# Patient Record
Sex: MALE | Race: WHITE | NOT HISPANIC OR LATINO | Employment: OTHER | URBAN - NONMETROPOLITAN AREA
[De-identification: names, ages, dates, MRNs, and addresses within clinical notes are randomized per-mention and may not be internally consistent; named-entity substitution may affect disease eponyms.]

---

## 2023-04-19 PROBLEM — R01.1 HEART MURMUR: Status: ACTIVE | Noted: 2023-04-19

## 2023-04-19 PROBLEM — M25.512 LEFT SHOULDER PAIN: Status: ACTIVE | Noted: 2023-04-19

## 2023-04-19 PROBLEM — I71.20 AORTIC ANEURYSM, THORACIC (CMS-HCC): Status: ACTIVE | Noted: 2023-04-19

## 2023-04-19 PROBLEM — Z98.890 STATUS POST IMPLANTATION OF MITRAL VALVE LEAFLET CLIP: Status: ACTIVE | Noted: 2023-04-19

## 2023-04-19 PROBLEM — Z78.9 SELF-CATHETERIZES URINARY BLADDER: Status: ACTIVE | Noted: 2019-08-13

## 2023-04-19 PROBLEM — Z95.818 STATUS POST IMPLANTATION OF MITRAL VALVE LEAFLET CLIP: Status: ACTIVE | Noted: 2023-04-19

## 2023-04-19 PROBLEM — M19.019 DJD OF SHOULDER: Status: ACTIVE | Noted: 2023-04-19

## 2023-04-19 PROBLEM — I10 BENIGN ESSENTIAL HYPERTENSION: Status: ACTIVE | Noted: 2023-04-19

## 2023-04-19 PROBLEM — N35.919 URETHRAL STRICTURE: Status: ACTIVE | Noted: 2023-04-19

## 2023-04-19 PROBLEM — I34.0 MITRAL REGURGITATION: Status: ACTIVE | Noted: 2023-04-19

## 2023-04-19 PROBLEM — M19.019 DEGENERATIVE JOINT DISEASE OF SHOULDER REGION: Status: ACTIVE | Noted: 2022-09-24

## 2023-04-19 PROBLEM — T46.6X5A MYALGIA DUE TO HMG COA REDUCTASE INHIBITOR: Status: ACTIVE | Noted: 2023-04-19

## 2023-04-19 PROBLEM — H90.3 BILATERAL SENSORINEURAL HEARING LOSS: Status: ACTIVE | Noted: 2023-04-19

## 2023-04-19 PROBLEM — E78.5 HYPERLIPIDEMIA: Status: ACTIVE | Noted: 2023-04-19

## 2023-04-19 PROBLEM — R30.0 DYSURIA: Status: RESOLVED | Noted: 2023-04-19 | Resolved: 2023-04-19

## 2023-04-19 PROBLEM — I49.3 FREQUENT PVCS: Status: ACTIVE | Noted: 2023-04-19

## 2023-04-19 PROBLEM — M25.511 RIGHT SHOULDER PAIN: Status: ACTIVE | Noted: 2023-04-19

## 2023-04-19 PROBLEM — Z85.46 HISTORY OF PROSTATE CANCER: Status: ACTIVE | Noted: 2023-04-19

## 2023-04-19 PROBLEM — D64.9 ANEMIA: Status: ACTIVE | Noted: 2023-04-19

## 2023-04-19 PROBLEM — M79.10 MYALGIA DUE TO HMG COA REDUCTASE INHIBITOR: Status: ACTIVE | Noted: 2023-04-19

## 2023-04-19 PROBLEM — N39.41 URGE INCONTINENCE: Status: ACTIVE | Noted: 2023-04-19

## 2023-04-19 PROBLEM — I50.20 HFREF (HEART FAILURE WITH REDUCED EJECTION FRACTION) (MULTI): Status: ACTIVE | Noted: 2023-04-19

## 2023-04-19 PROBLEM — R31.21 ASYMPTOMATIC MICROSCOPIC HEMATURIA: Status: ACTIVE | Noted: 2023-04-19

## 2023-04-19 RX ORDER — LISINOPRIL 5 MG/1
1 TABLET ORAL DAILY
COMMUNITY
Start: 2022-12-30 | End: 2024-01-23 | Stop reason: ALTCHOICE

## 2023-04-19 RX ORDER — ASPIRIN 81 MG/1
1 TABLET ORAL DAILY
COMMUNITY
Start: 2022-10-31

## 2023-04-19 RX ORDER — METOPROLOL SUCCINATE 25 MG/1
1 TABLET, EXTENDED RELEASE ORAL DAILY
COMMUNITY
Start: 2022-12-30 | End: 2024-04-12 | Stop reason: SDUPTHER

## 2023-04-19 RX ORDER — FUROSEMIDE 20 MG/1
1 TABLET ORAL DAILY
COMMUNITY
Start: 2022-12-02 | End: 2024-01-23 | Stop reason: ALTCHOICE

## 2023-04-19 RX ORDER — PRAVASTATIN SODIUM 40 MG/1
1 TABLET ORAL DAILY
COMMUNITY
Start: 2017-11-13 | End: 2023-10-09

## 2023-04-19 RX ORDER — ACETAMINOPHEN 500 MG
1 TABLET ORAL DAILY
COMMUNITY
Start: 2023-03-03

## 2023-07-25 ENCOUNTER — OFFICE VISIT (OUTPATIENT)
Dept: PRIMARY CARE | Facility: CLINIC | Age: 83
End: 2023-07-25
Payer: MEDICARE

## 2023-07-25 ENCOUNTER — LAB (OUTPATIENT)
Dept: LAB | Facility: LAB | Age: 83
End: 2023-07-25
Payer: MEDICARE

## 2023-07-25 VITALS
BODY MASS INDEX: 25.11 KG/M2 | DIASTOLIC BLOOD PRESSURE: 61 MMHG | TEMPERATURE: 97.3 F | RESPIRATION RATE: 14 BRPM | OXYGEN SATURATION: 96 % | SYSTOLIC BLOOD PRESSURE: 129 MMHG | WEIGHT: 175.4 LBS | HEART RATE: 73 BPM | HEIGHT: 70 IN

## 2023-07-25 DIAGNOSIS — I71.21 ANEURYSM OF ASCENDING AORTA WITHOUT RUPTURE (CMS-HCC): Primary | ICD-10-CM

## 2023-07-25 DIAGNOSIS — I10 BENIGN ESSENTIAL HYPERTENSION: ICD-10-CM

## 2023-07-25 DIAGNOSIS — D64.9 ANEMIA, UNSPECIFIED TYPE: ICD-10-CM

## 2023-07-25 DIAGNOSIS — Z95.818 STATUS POST IMPLANTATION OF MITRAL VALVE LEAFLET CLIP: ICD-10-CM

## 2023-07-25 DIAGNOSIS — I50.20 HFREF (HEART FAILURE WITH REDUCED EJECTION FRACTION) (MULTI): ICD-10-CM

## 2023-07-25 DIAGNOSIS — M19.012 PRIMARY OSTEOARTHRITIS OF LEFT SHOULDER: ICD-10-CM

## 2023-07-25 DIAGNOSIS — Z98.890 STATUS POST IMPLANTATION OF MITRAL VALVE LEAFLET CLIP: ICD-10-CM

## 2023-07-25 PROBLEM — M25.512 LEFT SHOULDER PAIN: Status: RESOLVED | Noted: 2023-04-19 | Resolved: 2023-07-25

## 2023-07-25 PROBLEM — R31.21 ASYMPTOMATIC MICROSCOPIC HEMATURIA: Status: RESOLVED | Noted: 2023-04-19 | Resolved: 2023-07-25

## 2023-07-25 PROBLEM — E78.5 HYPERLIPIDEMIA: Status: RESOLVED | Noted: 2023-04-19 | Resolved: 2023-07-25

## 2023-07-25 PROBLEM — M25.511 RIGHT SHOULDER PAIN: Status: RESOLVED | Noted: 2023-04-19 | Resolved: 2023-07-25

## 2023-07-25 LAB
ALANINE AMINOTRANSFERASE (SGPT) (U/L) IN SER/PLAS: 14 U/L (ref 10–52)
ALBUMIN (G/DL) IN SER/PLAS: 4.4 G/DL (ref 3.4–5)
ALKALINE PHOSPHATASE (U/L) IN SER/PLAS: 53 U/L (ref 33–136)
ANION GAP IN SER/PLAS: 13 MMOL/L (ref 10–20)
ASPARTATE AMINOTRANSFERASE (SGOT) (U/L) IN SER/PLAS: 17 U/L (ref 9–39)
BASOPHILS (10*3/UL) IN BLOOD BY AUTOMATED COUNT: 0.03 X10E9/L (ref 0–0.1)
BASOPHILS/100 LEUKOCYTES IN BLOOD BY AUTOMATED COUNT: 0.6 % (ref 0–2)
BILIRUBIN TOTAL (MG/DL) IN SER/PLAS: 1.5 MG/DL (ref 0–1.2)
CALCIUM (MG/DL) IN SER/PLAS: 9.5 MG/DL (ref 8.6–10.6)
CARBON DIOXIDE, TOTAL (MMOL/L) IN SER/PLAS: 23 MMOL/L (ref 21–32)
CHLORIDE (MMOL/L) IN SER/PLAS: 106 MMOL/L (ref 98–107)
CREATININE (MG/DL) IN SER/PLAS: 1.31 MG/DL (ref 0.5–1.3)
EOSINOPHILS (10*3/UL) IN BLOOD BY AUTOMATED COUNT: 0.17 X10E9/L (ref 0–0.4)
EOSINOPHILS/100 LEUKOCYTES IN BLOOD BY AUTOMATED COUNT: 3.1 % (ref 0–6)
ERYTHROCYTE DISTRIBUTION WIDTH (RATIO) BY AUTOMATED COUNT: 14.4 % (ref 11.5–14.5)
ERYTHROCYTE MEAN CORPUSCULAR HEMOGLOBIN CONCENTRATION (G/DL) BY AUTOMATED: 32.3 G/DL (ref 32–36)
ERYTHROCYTE MEAN CORPUSCULAR VOLUME (FL) BY AUTOMATED COUNT: 87 FL (ref 80–100)
ERYTHROCYTES (10*6/UL) IN BLOOD BY AUTOMATED COUNT: 4.66 X10E12/L (ref 4.5–5.9)
GFR MALE: 55 ML/MIN/1.73M2
GLUCOSE (MG/DL) IN SER/PLAS: 91 MG/DL (ref 74–99)
HEMATOCRIT (%) IN BLOOD BY AUTOMATED COUNT: 40.5 % (ref 41–52)
HEMOGLOBIN (G/DL) IN BLOOD: 13.1 G/DL (ref 13.5–17.5)
IMMATURE GRANULOCYTES/100 LEUKOCYTES IN BLOOD BY AUTOMATED COUNT: 0.2 % (ref 0–0.9)
LEUKOCYTES (10*3/UL) IN BLOOD BY AUTOMATED COUNT: 5.5 X10E9/L (ref 4.4–11.3)
LYMPHOCYTES (10*3/UL) IN BLOOD BY AUTOMATED COUNT: 1.46 X10E9/L (ref 0.8–3)
LYMPHOCYTES/100 LEUKOCYTES IN BLOOD BY AUTOMATED COUNT: 26.8 % (ref 13–44)
MONOCYTES (10*3/UL) IN BLOOD BY AUTOMATED COUNT: 0.65 X10E9/L (ref 0.05–0.8)
MONOCYTES/100 LEUKOCYTES IN BLOOD BY AUTOMATED COUNT: 11.9 % (ref 2–10)
NEUTROPHILS (10*3/UL) IN BLOOD BY AUTOMATED COUNT: 3.13 X10E9/L (ref 1.6–5.5)
NEUTROPHILS/100 LEUKOCYTES IN BLOOD BY AUTOMATED COUNT: 57.4 % (ref 40–80)
NRBC (PER 100 WBCS) BY AUTOMATED COUNT: 0 /100 WBC (ref 0–0)
PLATELETS (10*3/UL) IN BLOOD AUTOMATED COUNT: 204 X10E9/L (ref 150–450)
POTASSIUM (MMOL/L) IN SER/PLAS: 4.6 MMOL/L (ref 3.5–5.3)
PROTEIN TOTAL: 6.6 G/DL (ref 6.4–8.2)
SODIUM (MMOL/L) IN SER/PLAS: 137 MMOL/L (ref 136–145)
UREA NITROGEN (MG/DL) IN SER/PLAS: 33 MG/DL (ref 6–23)

## 2023-07-25 PROCEDURE — 3074F SYST BP LT 130 MM HG: CPT | Performed by: FAMILY MEDICINE

## 2023-07-25 PROCEDURE — 36415 COLL VENOUS BLD VENIPUNCTURE: CPT

## 2023-07-25 PROCEDURE — 1125F AMNT PAIN NOTED PAIN PRSNT: CPT | Performed by: FAMILY MEDICINE

## 2023-07-25 PROCEDURE — 1170F FXNL STATUS ASSESSED: CPT | Performed by: FAMILY MEDICINE

## 2023-07-25 PROCEDURE — 1159F MED LIST DOCD IN RCRD: CPT | Performed by: FAMILY MEDICINE

## 2023-07-25 PROCEDURE — 1160F RVW MEDS BY RX/DR IN RCRD: CPT | Performed by: FAMILY MEDICINE

## 2023-07-25 PROCEDURE — 99214 OFFICE O/P EST MOD 30 MIN: CPT | Performed by: FAMILY MEDICINE

## 2023-07-25 PROCEDURE — 1036F TOBACCO NON-USER: CPT | Performed by: FAMILY MEDICINE

## 2023-07-25 PROCEDURE — 85025 COMPLETE CBC W/AUTO DIFF WBC: CPT

## 2023-07-25 PROCEDURE — 3078F DIAST BP <80 MM HG: CPT | Performed by: FAMILY MEDICINE

## 2023-07-25 PROCEDURE — 80053 COMPREHEN METABOLIC PANEL: CPT

## 2023-07-25 ASSESSMENT — ACTIVITIES OF DAILY LIVING (ADL)
MANAGING_FINANCES: INDEPENDENT
GROCERY_SHOPPING: INDEPENDENT
DOING_HOUSEWORK: INDEPENDENT
TAKING_MEDICATION: INDEPENDENT
BATHING: INDEPENDENT
DRESSING: INDEPENDENT

## 2023-07-25 ASSESSMENT — PATIENT HEALTH QUESTIONNAIRE - PHQ9
SUM OF ALL RESPONSES TO PHQ9 QUESTIONS 1 AND 2: 0
1. LITTLE INTEREST OR PLEASURE IN DOING THINGS: NOT AT ALL
2. FEELING DOWN, DEPRESSED OR HOPELESS: NOT AT ALL

## 2023-07-25 NOTE — ASSESSMENT & PLAN NOTE
Pain has been noticeably more severe.  Follows with Dr. Madden for periodic cortisone injections.  His most recent appt was canceled. Now scheduled for August 1st.  Discussed that he may be candidate for surgery.

## 2023-07-25 NOTE — PROGRESS NOTES
"Subjective     Patient ID: Josesito Tamayo is a 83 y.o. male who presents for follow up of chronic medical conditions.      Td- 06/15/2021     High cholesterol evaluation.     Supplements: y   specific diet plan: y   exercising 30 min daily?: y     Fatigue:N  Chest pains:N  Shortness of Breath:N  Sudden Headaches: N  Vision loss: N  Syncope (fainting): N  Leg Claudication (limping/leg tiredness): N   Taking medication daily: Y  Medication side effects:N    High blood pressure evaluation.     Review of symptoms:  Fatigue: N  Headaches: N   Blurred vision:  N  Palpitations: N  Chest pains: N  Shortness of Breath: N  Swelling of legs: N   Blood in urine: N   Taking medications daily: Y   Medication side effects:N   Problems with medication compliance:N  Baby Aspirin 81 mg daily: Y         HPI  Left shoulder pain: Patient was scheduled to see Dr. Madden left shoulder injection but his appt was cancelled. He states that pain was so severe that he had difficulty sleeping. Since then, the pain has improved and become more manageable. He has been able to control pain in the past with ibuprofen but it no longer is providing effective relief. Previous cortisone injections have provided relief.     Patient reports mild cough. He suspects it is a side effect of lisinopril.     Patient c/o constipation.    Follows with audiologist for hearing aids.    Review of Systems  constitutional: as per HPI  eyes:as per HPI  CV:as per HPI  Respiratory:as per HPI  GI:as per HPI  neuro:as per HPI  endo:as per HPI  heme/lymph:as per HPI     Objective   /61 (BP Location: Right arm, Patient Position: Sitting, BP Cuff Size: Adult)   Pulse 73   Temp 36.3 °C (97.3 °F) (Temporal)   Resp 14   Ht 1.778 m (5' 10\")   Wt 79.6 kg (175 lb 6.4 oz)   SpO2 96%   BMI 25.17 kg/m²   Physical Exam  Constitutional:       Appearance: Normal appearance. He is overweight.   Cardiovascular:      Rate and Rhythm: Normal rate and regular rhythm. "   Pulmonary:      Effort: Pulmonary effort is normal.      Breath sounds: Normal breath sounds.         Assessment/Plan   Problem List Items Addressed This Visit          Cardiac and Vasculature    Aortic aneurysm, thoracic (CMS/HCC)     Stable.  No change in size per echocardiogram from March 2023.  Continue to monitor.         Benign essential hypertension     Hypertension:  Blood pressure in office today was   BP Readings from Last 1 Encounters:   07/25/23 129/61   The goal range for blood pressure is below 130/80.   We will continue to monitor.   Continue lisinopril 5 mg and metoprolol 25 mg daily.         Relevant Orders    Comprehensive Metabolic Panel    HFrEF (heart failure with reduced ejection fraction) (CMS/HCC)     Echo March 2023. EF of 40%.  Due for repeat in 2024.  Continue Lasix, lisinopril, and metoprolol.         Status post implantation of mitral valve leaflet clip     Underwent MitraClip procedure in December 2022.   Follows with [facility].            Hematology and Neoplasia    Anemia     Mild.  Hemoglobin 13.4 in January 2023.  Ordered CBC.         Relevant Orders    CBC and Auto Differential    Comprehensive Metabolic Panel       Musculoskeletal and Injuries    DJD of shoulder     Pain has been noticeably more severe.  Follows with Dr. Madden for periodic cortisone injections.  His most recent appt was canceled. Now scheduled for August 1st.  Discussed that he may be candidate for surgery.          Other Visit Diagnoses       Routine general medical examination at health care facility    -  Primary    Screening for multiple conditions              Follow up in 6 months.      By signing my name below ISam scribe, attest that this documentation has been prepared under the direction and in the presence of Dr. Ronnie Kay. 07/25/23

## 2023-07-25 NOTE — PROGRESS NOTES
"Subjective   Reason for Visit: Josesito Tamayo is an 83 y.o. male here for a Medicare Wellness visit.          Reviewed all medications by prescribing practitioner or clinical pharmacist (such as prescriptions, OTCs, herbal therapies and supplements) and documented in the medical record.    HPI      Patient Care Team:  Ronnie Kay MD as PCP - General  Ronnie Kay MD as PCP - [employer] PCP     Review of Systems  constitutional: as per HPI  eyes:as per HPI  CV:as per HPI  Respiratory:as per HPI  GI:as per HPI  neuro:as per HPI  endo:as per HPI  heme/lymph:as per HPI     Objective   Vitals:  /61 (BP Location: Right arm, Patient Position: Sitting, BP Cuff Size: Adult)   Pulse 73   Temp 36.3 °C (97.3 °F) (Temporal)   Resp 14   Ht 1.778 m (5' 10\")   Wt 79.6 kg (175 lb 6.4 oz)   SpO2 96%   BMI 25.17 kg/m²       Physical Exam  Constitutional:       Appearance: Normal appearance. He is overweight.   Cardiovascular:      Rate and Rhythm: Normal rate and regular rhythm.   Pulmonary:      Effort: Pulmonary effort is normal.      Breath sounds: Normal breath sounds.         Assessment/Plan   Problem List Items Addressed This Visit    None  Visit Diagnoses       Routine general medical examination at health care facility    -  Primary    Screening for multiple conditions                   By signing my name below I, ashleigh Ballard, attest that this documentation has been prepared under the direction and in the presence of Dr. Ronnie Kay. 07/25/23   "

## 2023-07-25 NOTE — ASSESSMENT & PLAN NOTE
Hypertension:  Blood pressure in office today was   BP Readings from Last 1 Encounters:   07/25/23 129/61     The goal range for blood pressure is below 130/80.   We will continue to monitor.   Continue lisinopril 5 mg and metoprolol 25 mg daily.

## 2023-07-25 NOTE — PATIENT INSTRUCTIONS
Recommended to start using daily Metamucil fiber supplement (1 heaping tbsp in 8 oz of water).  Discussed that Lasix and metoprolol may be contributing to constipation.

## 2023-07-25 NOTE — PROGRESS NOTES
"Subjective   Reason for Visit: Josesito Tamayo is an 83 y.o. male here for a Medicare Wellness visit.          Reviewed all medications by prescribing practitioner or clinical pharmacist (such as prescriptions, OTCs, herbal therapies and supplements) and documented in the medical record.    HPI    Patient Care Team:  Ronnie Kay MD as PCP - General  Ronnie Kay MD as PCP - [employer] PCP     Review of Systems    Objective   Vitals:  /61 (BP Location: Right arm, Patient Position: Sitting, BP Cuff Size: Adult)   Pulse 73   Temp 36.3 °C (97.3 °F) (Temporal)   Resp 14   Ht 1.778 m (5' 10\")   Wt 79.6 kg (175 lb 6.4 oz)   SpO2 96%   BMI 25.17 kg/m²       Physical Exam    Assessment/Plan   Problem List Items Addressed This Visit    None  Visit Diagnoses     Routine general medical examination at health care facility    -  Primary    Screening for multiple conditions                 "

## 2023-09-27 ENCOUNTER — HOSPITAL ENCOUNTER (OUTPATIENT)
Facility: HOSPITAL | Age: 83
Setting detail: OUTPATIENT SURGERY
End: 2023-09-27
Attending: ORTHOPAEDIC SURGERY | Admitting: ORTHOPAEDIC SURGERY
Payer: MEDICARE

## 2023-10-03 ENCOUNTER — HOSPITAL ENCOUNTER (OUTPATIENT)
Dept: RADIOLOGY | Facility: HOSPITAL | Age: 83
Discharge: HOME | End: 2023-10-03
Payer: MEDICARE

## 2023-10-03 DIAGNOSIS — M19.019 PRIMARY OSTEOARTHRITIS, UNSPECIFIED SHOULDER: ICD-10-CM

## 2023-10-03 PROCEDURE — 73200 CT UPPER EXTREMITY W/O DYE: CPT | Mod: LEFT SIDE | Performed by: RADIOLOGY

## 2023-10-03 PROCEDURE — 73200 CT UPPER EXTREMITY W/O DYE: CPT

## 2023-10-03 PROCEDURE — 76497 UNLISTED CT PROCEDURE: CPT | Mod: LT

## 2023-10-09 DIAGNOSIS — E78.5 HYPERLIPIDEMIA, UNSPECIFIED HYPERLIPIDEMIA TYPE: ICD-10-CM

## 2023-10-09 RX ORDER — PRAVASTATIN SODIUM 40 MG/1
40 TABLET ORAL DAILY
Qty: 90 TABLET | Refills: 3 | Status: SHIPPED | OUTPATIENT
Start: 2023-10-09

## 2023-10-10 ENCOUNTER — OFFICE VISIT (OUTPATIENT)
Dept: ORTHOPEDIC SURGERY | Facility: HOSPITAL | Age: 83
End: 2023-10-10
Payer: MEDICARE

## 2023-10-10 DIAGNOSIS — M25.512 LEFT SHOULDER PAIN, UNSPECIFIED CHRONICITY: Primary | ICD-10-CM

## 2023-10-10 PROCEDURE — 1159F MED LIST DOCD IN RCRD: CPT | Performed by: ORTHOPAEDIC SURGERY

## 2023-10-10 PROCEDURE — 1125F AMNT PAIN NOTED PAIN PRSNT: CPT | Performed by: ORTHOPAEDIC SURGERY

## 2023-10-10 PROCEDURE — 99213 OFFICE O/P EST LOW 20 MIN: CPT | Mod: 25 | Performed by: ORTHOPAEDIC SURGERY

## 2023-10-10 PROCEDURE — 1160F RVW MEDS BY RX/DR IN RCRD: CPT | Performed by: ORTHOPAEDIC SURGERY

## 2023-10-10 PROCEDURE — 99213 OFFICE O/P EST LOW 20 MIN: CPT | Performed by: ORTHOPAEDIC SURGERY

## 2023-10-10 PROCEDURE — 1036F TOBACCO NON-USER: CPT | Performed by: ORTHOPAEDIC SURGERY

## 2023-10-10 NOTE — PROGRESS NOTES
Subjective    Patient ID: Josesito Tamayo is a 83 y.o. male.    Chief Complaint: Left shoulder pain    Mr. Tamayo is a 82-year-old male who is well known to us for evaluation of his right shoulder pain. Reporting today due to a new onset of left shoulder pain. He explains that he also had his shingles and Covid boosters injected into his left shoulder which exacerbated his symptoms. He has done well with injections in his right arm and is hoping for one in his left arm today.    His right shoulder is doing well and is a 4/10 at present. There has been no recent injury. He continues to enjoy playing golf. He wants to continue injections and avoid surgery if possible. He doesn't believe he needs a repeat injection in his right arm today. He notes that his shoulder is about 80% normal currently. He has had an ultrasound guided injection into his scapula in the past which helped his pain. He hopes for a repeat injection today.    Past medical history, surgical history, social history, family history were all reviewed and are as per the [facility] patient health history questionnaire form that I signed and scanned into the chart today.    08/01/23  He presents today not feeling well. He explains his left shoulder injection worked fairly well for a while. The last 8-9 weeks have been rough since it wore off and his appointment was cancelled.     10/10/23  Josesito returns to the clinic today for a repeat followup visit regarding his left shoulder.     He explains today that injections have been helping again. He is hoping to continue with these today.           Objective   Patient is a well-developed, well-nourished male in no acute distress.  Breathes with normal chest rises.  Pupils are round and symmetric today.  Awake, alert, and oriented x3.      Examination of the left shoulder today reveals the skin to be intact. There is no sign of any atrophy, lesions, or abrasions. There is no pain to palpation of the bony prominences.  Cervical lymphadenopathy examined, and this was negative. Patient had 5 out of 5 wrist flexion, extension, and thumb extension bilaterally. Sensation was intact to light touch to median, ulnar, radial axillary, and musculocutaneous nerves bilaterally. Positive radial pulse bilaterally. Provocative maneuvers on the left side today were negative.  Range of motion of the left shoulder revealed 0-170° of forward elevation, 0-60° of external rotation, and internal rotation was to T-12.     Examination of the right shoulder today reveals the skin to be intact. There is no sign of any atrophy, lesions, or abrasions. There is no pain to palpation of the bony prominences. Cervical lymphadenopathy examined, and this was negative. Patient had 5 out of 5 wrist flexion, extension, and thumb extension, bilaterally. Sensation was intact to light touch to median, ulnar, radial, axillary, and musculocutaneous nerves bilaterally. Positive radial pulse bilaterally. Provocative maneuvers on the right side today were negative.  Range of motion of the right shoulder revealed 0-170° of forward elevation, 0-60° of external rotation, and internal rotation up to T-12.     Image Results:  CT unlisted procedure  Narrative: Interpreted By:  Vik Martinez,   STUDY:  CT left shoulder; 10/3/2023 9:41 am      INDICATION:  Signs/Symptoms:m19.019.      COMPARISON:  None.      ACCESSION NUMBER(S):  [id number]      ORDERING CLINICIAN:  MARINO GARCIA      TECHNIQUE:  Helical trans axial images of the left shoulder were obtained with  additional orthogonal reconstructions with bone technique.      FINDINGS:  Bony structures:  Intact without evidence of fracture or dislocation.      Joint spaces:  Marked narrowing of the glenohumeral joint with  moderate subchondral sclerosis and subchondral cyst formation, and  mild osteophytosis particularly inferiorly. There is a small to  moderate joint effusion, without radiodense loose body. The  acromioclavicular  joint is maintained.      Tendons and ligaments:  Maintained as visualized.      Musculature and fascia:  Maintained.      Subcutaneous tissue:  Unremarkable without significant edema.      Vasculature:  There is moderate coronary artery atherosclerotic  calcification primarily involving the LAD.      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      Signed by: Vik Martinez 10/5/2023 5:18 PM  Dictation workstation:   [id number]  CT also shows a type A glenoid    Assessment/Plan   Encounter Diagnoses:  Left shoulder pain, unspecified chronicity    Patient with largely resolved right shoulder pain with underlying chronic issue that flares up, as well as endstage arthritis of the left shoulder    At this time we had a long discussion about the various options for shoulder arthritis.  1. Do nothing. Continue activity modifications.  2. Consider cortisone injections into the glenohumeral joint. This would give pain relief that is temporary. This would not stop the progression of arthritis. For some patients, this injection can last not at all, for 2 weeks, 4 weeks, 3 months or longer. The risk of the injection is fairly minimal but does include a very small chance of infection.  3. A total shoulder replacement is the third option. This will give the patient a more permanent solution to pain relief. It would also improve function. There is no rush to this procedure it is an elective procedure.    We discussed his diagnosis today at length. At this point his treatment options are to continue with injections or move forward with surgery. We will continue injections every 10 weeks. His CT scan is good until December 2024 for surgical planning. From my standpoint there is still no rush to surgery. We did discuss the fact that he will need to straight catch after surgery when we get there, and that he will be able to do that while recovering.     No orders of the  defined types were placed in this encounter.  Followup in 10 weeks  No follow-ups on file.    Scribe Attestation  By signing my name below, I, Joyce Subramanian   attest that this documentation has been prepared under the direction and in the presence of Adelfo Madden MD.

## 2023-11-06 ENCOUNTER — DOCUMENTATION (OUTPATIENT)
Dept: AUDIOLOGY | Facility: CLINIC | Age: 83
End: 2023-11-06
Payer: MEDICARE

## 2023-11-06 NOTE — PROGRESS NOTES
HEARING AID DROP OFF    Patient dropped off his hearing aids and  due to broken left .   was replaced and listening check revealed good working function of device.  Patient was called and discussed his  had been replaced under warranty.  Patient notified device can be picked up at the  at his convenience.  Patient satisfied.    N/c under warranty repair.    Sanjay Arreola, CCC-A

## 2023-11-09 ENCOUNTER — APPOINTMENT (OUTPATIENT)
Dept: UROLOGY | Facility: CLINIC | Age: 83
End: 2023-11-09
Payer: MEDICARE

## 2023-12-07 ENCOUNTER — TELEMEDICINE (OUTPATIENT)
Dept: CARDIOLOGY | Facility: CLINIC | Age: 83
End: 2023-12-07
Payer: MEDICARE

## 2023-12-07 DIAGNOSIS — Z95.2 S/P TAVR (TRANSCATHETER AORTIC VALVE REPLACEMENT): Primary | ICD-10-CM

## 2023-12-07 DIAGNOSIS — Z98.890 S/P MITRAL VALVE CLIP IMPLANTATION: ICD-10-CM

## 2023-12-07 DIAGNOSIS — Z95.818 S/P MITRAL VALVE CLIP IMPLANTATION: ICD-10-CM

## 2023-12-07 PROCEDURE — 99214 OFFICE O/P EST MOD 30 MIN: CPT | Performed by: NURSE PRACTITIONER

## 2023-12-07 NOTE — PROGRESS NOTES
Structural Heart Follow up visit      Josesito Tamayo is a 83 y.o.  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 Presents 1 month s/p AVTAR - MitraClip x2 XTW (between  and A2/P2) using general anesthesia on 22 with Dr. Dominguez.       denies SOB,CAMPUZANO, fatigue  Recent Hospitalizations  No    patient with   Past Medical History:   Diagnosis Date    Bursitis of unspecified shoulder 2020    Scapulothoracic bursitis    Candidiasis of skin and nail 2019    Candidal dermatitis    Chronic rhinitis 10/09/2017    Rhinitis    Disorder of male genital organs, unspecified 2016    Disorder of scrotum    Dysuria 2023    Encounter for immunization 06/15/2021    Encounter for immunization    Encounter for immunization     Immunization due    Encounter for other preprocedural examination 2022    Preoperative clearance    Encounter for screening for depression 2022    Screening for depression    Laceration without foreign body of unspecified finger without damage to nail, initial encounter 06/15/2021    Finger laceration    Other conditions influencing health status 10/09/2017    History of cough    Other difficulties with micturition 2016    Abnormal urination    Other difficulties with micturition 2016    Abnormal urinary stream    Other shoulder lesions, unspecified shoulder 2015    Rotator cuff tendinitis    Other specified disorders of ear, unspecified ear 2017    Ear fullness    Other specified disorders of eustachian tube, right ear 11/10/2017    Dysfunction of right eustachian tube    Other specified personal risk factors, not elsewhere classified 2019    Pneumococcal vaccination indicated    Other specified personal risk factors, not elsewhere classified 2018    Pneumococcal vaccination indicated    Other symptoms and signs involving the genitourinary system  07/11/2016    Abnormal prostate exam    Pain in unspecified knee 01/05/2015    Knee pain    Personal history of malignant neoplasm of prostate 12/28/2020    History of prostate cancer    Personal history of other diseases of the circulatory system     History of hypertension    Personal history of other diseases of the circulatory system     Personal history of cardiac murmur    Personal history of other diseases of the nervous system and sense organs 11/27/2017    History of eustachian tube dysfunction    Personal history of other endocrine, nutritional and metabolic disease     History of high cholesterol    Personal history of other infectious and parasitic diseases     History of mumps    Personal history of other specified conditions 02/20/2018    History of urinary frequency    Personal history of other specified conditions     History of urinary frequency    Personal history of other specified conditions 07/21/2022    History of dysuria    Personal history of other specified conditions 04/01/2015    History of gross hematuria    Personal history of urinary (tract) infections 08/10/2018    History of recurrent urinary tract infection    Personal history of urinary (tract) infections 07/11/2016    History of urinary tract infection    Sudden idiopathic hearing loss, 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     No echocardiogram results found for the past 12 months          Heart Failure Follow up    NYHA class 1    Edema Denies  Dyspnea on Exertion Denies  Fatigue Denies  Exercise Intolerance Denies  Orthopnea Denies  PND Denies    Chest pain No  Syncope No  Palpitations No  Weight gain No  Weight loss No        All organ systems normal           K[facility]Q Questionnaire      1  Heart failure affects different people in  different ways. Some feel shortness of breath while others feel fatigue. Please indicate how much you are limited by heart failure (shortness of breath or fatigue) in your ability to do the following activities over the past 2 weeks.     A.) Showering/bathing  5. Not at All  B.) Walking 1 block on level ground 5. Not at All  C.) Hurrying or Jogging   5. Not at All    2.  Over the past 2 weeks, how many times did you have swelling in your feet, ankles or legs when you woke up in the morning? 5. Never    3.  Over the past 2 weeks, on average, how many times has fatigue limited your ability to do what you wanted? 7. Never    4.  Over the past 2 weeks, on average, how many times has shortness of breath limited your ability to do what you wanted? 7. Never    5.  Over the past 2 weeks, on average, how many times have you been forced to sleep sitting up in a chair or with at least 3 pillows to prop you up because of shortness of breath? Never    6. Over the past 2 weeks, how much has your heart failure limited your enjoyment of life? It has slightly limited my enjoyment of life    7. If you had to spend the rest of your life with your heart failure the way it is right now, how would you feel about this? 4. Mostly satisfied    8. How much does your heart failure affect your lifestyle? Please indicate how your heart failure may have limited yourparticipation in the following activities over the past 2 weeks    A.)  Hobbies, recreational activities  5. Did not limit at all    B.) Working or doing household chores  5. Did not limit at all    C.) Visiting family or friends out of your home  5. Did not limit at all    Impression    Doing well clinically, states he has no lower extremity edema. Does not check BP at home, states it has always been wnls. States he does not get dizzy when bending down to place sandy in ground or picking up sticks      Plan:  - Cont current medication regimen  - ASA for life  - echo now  - Cont to  increase activity as tolerated   - f/u with Dr Jackson  - Life-long Dental SBE prophylaxis needed      Time Spent:I spent 25 minutes of a total visit of 10 minutes in counseling/ direct management/discussion/coordination of Josesito's care.

## 2023-12-21 ENCOUNTER — APPOINTMENT (OUTPATIENT)
Dept: ORTHOPEDIC SURGERY | Facility: HOSPITAL | Age: 83
End: 2023-12-21
Payer: MEDICARE

## 2023-12-22 ENCOUNTER — HOSPITAL ENCOUNTER (OUTPATIENT)
Dept: CARDIOLOGY | Facility: CLINIC | Age: 83
Discharge: HOME | End: 2023-12-22
Payer: MEDICARE

## 2023-12-22 DIAGNOSIS — Z95.818 S/P MITRAL VALVE CLIP IMPLANTATION: ICD-10-CM

## 2023-12-22 DIAGNOSIS — Z98.890 S/P MITRAL VALVE CLIP IMPLANTATION: ICD-10-CM

## 2023-12-22 PROCEDURE — 93306 TTE W/DOPPLER COMPLETE: CPT | Performed by: INTERNAL MEDICINE

## 2023-12-22 PROCEDURE — 93306 TTE W/DOPPLER COMPLETE: CPT

## 2023-12-25 LAB
AORTIC VALVE PEAK VELOCITY: 1.77
AV PEAK GRADIENT: 12.5
AVA (PEAK VEL): 2.19
EJECTION FRACTION APICAL 4 CHAMBER: 55.3
EJECTION FRACTION: 57
LEFT VENTRICLE INTERNAL DIMENSION DIASTOLE: 6.71 (ref 3.5–6)
LEFT VENTRICULAR OUTFLOW TRACT DIAMETER: 2.3
MITRAL VALVE E/A RATIO: 2.05
MITRAL VALVE E/E' RATIO: 18.5
RIGHT VENTRICLE FREE WALL PEAK S': 18
RIGHT VENTRICLE PEAK SYSTOLIC PRESSURE: 46.9
TRICUSPID ANNULAR PLANE SYSTOLIC EXCURSION: 2.6

## 2024-01-02 ENCOUNTER — APPOINTMENT (OUTPATIENT)
Dept: ORTHOPEDIC SURGERY | Facility: HOSPITAL | Age: 84
End: 2024-01-02
Payer: MEDICARE

## 2024-01-08 NOTE — PROGRESS NOTES
Subjective    Patient ID: Josesito Tamayo is a 83 y.o. male.    Chief Complaint: Left shoulder pain    Mr. Tamayo is a 82-year-old male who is well known to us for evaluation of his right shoulder pain. Reporting today due to a new onset of left shoulder pain. He explains that he also had his shingles and Covid boosters injected into his left shoulder which exacerbated his symptoms. He has done well with injections in his right arm and is hoping for one in his left arm today.    His right shoulder is doing well and is a 4/10 at present. There has been no recent injury. He continues to enjoy playing golf. He wants to continue injections and avoid surgery if possible. He doesn't believe he needs a repeat injection in his right arm today. He notes that his shoulder is about 80% normal currently. He has had an ultrasound guided injection into his scapula in the past which helped his pain. He hopes for a repeat injection today.    08/01/23  He presents today not feeling well. He explains his left shoulder injection worked fairly well for a while. The last 8-9 weeks have been rough since it wore off and his appointment was cancelled.     10/10/23  Josesito returns to the clinic today for a repeat followup visit regarding his left shoulder.     He explains today that injections have been helping again. He is hoping to continue with these today.     01/09/24  Josesito returns to the clinic today for a repeat follow up regarding his left shoulder.     He reports his left shoulder improved immensely after his previous injection. He explains that on Sunday night he took two Tylenol and then woke up yesterday morning and was able to reach above his head. He does still have some discomfort around his bicep.     Past medical history, surgical history, social history, family history were all reviewed and are as per the blue patient health history questionnaire form that I signed and scanned into the chart today.          Objective   Patient  is a well-developed, well-nourished male in no acute distress.  Breathes with normal chest rises.  Pupils are round and symmetric today.  Awake, alert, and oriented x3.      Examination of the left shoulder today reveals the skin to be intact. There is no sign of any atrophy, lesions, or abrasions. There is no pain to palpation of the bony prominences. Cervical lymphadenopathy examined, and this was negative. Patient had 5 out of 5 wrist flexion, extension, and thumb extension bilaterally. Sensation was intact to light touch to median, ulnar, radial axillary, and musculocutaneous nerves bilaterally. Positive radial pulse bilaterally. Provocative maneuvers on the left side today were negative.  Range of motion of the left shoulder revealed 0-170° of forward elevation, 0-60° of external rotation, and internal rotation was to T-12.     Examination of the right shoulder today reveals the skin to be intact. There is no sign of any atrophy, lesions, or abrasions. There is no pain to palpation of the bony prominences. Cervical lymphadenopathy examined, and this was negative. Patient had 5 out of 5 wrist flexion, extension, and thumb extension, bilaterally. Sensation was intact to light touch to median, ulnar, radial, axillary, and musculocutaneous nerves bilaterally. Positive radial pulse bilaterally. Provocative maneuvers on the right side today were negative.  Range of motion of the right shoulder revealed 0-170° of forward elevation, 0-60° of external rotation, and internal rotation up to T-12.     Image Results:  CT also shows a type A glenoid    Patient ID: Josesito Tamayo is a 83 y.o. male.    L Inj/Asp: L subacromial bursa on 1/9/2024 1:09 PM  Indications: pain  Details: 20 G needle, anterior approach  Outcome: tolerated well, no immediate complications  Procedure, treatment alternatives, risks and benefits explained, specific risks discussed. Consent was given by the patient. Immediately prior to procedure a time out  was called to verify the correct patient, procedure, equipment, support staff and site/side marked as required. Patient was prepped and draped in the usual sterile fashion.           Assessment/Plan   Encounter Diagnoses:  No diagnosis found.  Patient with largely resolved right shoulder pain with underlying chronic issue that flares up, as well as endstage arthritis of the left shoulder    At this time we had a long discussion about the various options for shoulder arthritis.  1. Do nothing. Continue activity modifications.  2. Consider cortisone injections into the glenohumeral joint. This would give pain relief that is temporary. This would not stop the progression of arthritis. For some patients, this injection can last not at all, for 2 weeks, 4 weeks, 3 months or longer. The risk of the injection is fairly minimal but does include a very small chance of infection.  3. A total shoulder replacement is the third option. This will give the patient a more permanent solution to pain relief. It would also improve function. There is no rush to this procedure it is an elective procedure.    We did a repeat injection for him today. He tolerated this well. We will see him back in 3 and a half months for a repeat injection at that time. If he feels that he needs a repeat injection at 3 months he is to call us and let us know.     No orders of the defined types were placed in this encounter.    Follow up in 3 and a half months.     Scribe Attestation  By signing my name below, Britni RODRIGUEZ Scribe   attest that this documentation has been prepared under the direction and in the presence of Adelfo Madden MD.

## 2024-01-09 ENCOUNTER — OFFICE VISIT (OUTPATIENT)
Dept: ORTHOPEDIC SURGERY | Facility: HOSPITAL | Age: 84
End: 2024-01-09
Payer: MEDICARE

## 2024-01-09 VITALS — WEIGHT: 175 LBS | HEIGHT: 70 IN | BODY MASS INDEX: 25.05 KG/M2

## 2024-01-09 DIAGNOSIS — M25.511 CHRONIC RIGHT SHOULDER PAIN: Primary | ICD-10-CM

## 2024-01-09 DIAGNOSIS — G89.29 CHRONIC RIGHT SHOULDER PAIN: Primary | ICD-10-CM

## 2024-01-09 PROCEDURE — 1159F MED LIST DOCD IN RCRD: CPT | Performed by: ORTHOPAEDIC SURGERY

## 2024-01-09 PROCEDURE — 20610 DRAIN/INJ JOINT/BURSA W/O US: CPT | Performed by: ORTHOPAEDIC SURGERY

## 2024-01-09 PROCEDURE — 1125F AMNT PAIN NOTED PAIN PRSNT: CPT | Performed by: ORTHOPAEDIC SURGERY

## 2024-01-09 PROCEDURE — 1160F RVW MEDS BY RX/DR IN RCRD: CPT | Performed by: ORTHOPAEDIC SURGERY

## 2024-01-09 PROCEDURE — 1036F TOBACCO NON-USER: CPT | Performed by: ORTHOPAEDIC SURGERY

## 2024-01-09 PROCEDURE — 99214 OFFICE O/P EST MOD 30 MIN: CPT | Performed by: ORTHOPAEDIC SURGERY

## 2024-01-09 ASSESSMENT — PAIN - FUNCTIONAL ASSESSMENT: PAIN_FUNCTIONAL_ASSESSMENT: 0-10

## 2024-01-09 ASSESSMENT — PAIN DESCRIPTION - DESCRIPTORS: DESCRIPTORS: ACHING

## 2024-01-09 ASSESSMENT — PAIN SCALES - GENERAL: PAINLEVEL_OUTOF10: 7

## 2024-01-20 PROBLEM — C61 MALIGNANT NEOPLASM OF PROSTATE (MULTI): Status: ACTIVE | Noted: 2024-01-20

## 2024-01-20 PROBLEM — Z86.19 HISTORY OF MUMPS: Status: ACTIVE | Noted: 2024-01-20

## 2024-01-20 PROBLEM — Z86.79 HISTORY OF HYPERTENSION: Status: ACTIVE | Noted: 2024-01-20

## 2024-01-20 PROBLEM — Z86.39 HISTORY OF METABOLIC DISORDER: Status: ACTIVE | Noted: 2024-01-20

## 2024-01-20 RX ORDER — SOLIFENACIN SUCCINATE 5 MG/1
5 TABLET, FILM COATED ORAL
COMMUNITY
Start: 2021-06-08 | End: 2024-01-23 | Stop reason: ALTCHOICE

## 2024-01-20 RX ORDER — ROSUVASTATIN CALCIUM 10 MG/1
10 TABLET, COATED ORAL DAILY
COMMUNITY
Start: 2018-05-17 | End: 2024-01-23 | Stop reason: ALTCHOICE

## 2024-01-22 NOTE — PROGRESS NOTES
HEARING AID CHECK    RIGHT: Phonak Audeo P50-R SN: [id number]  : 1 x M  Wax Guard/Dome: Cerushield; Large open  LEFT: Phonak Audeo P50-R SN: [id number]  : 1 x M  Wax Guard/Dome: Cerushield; Large open    Repair Warranty: 3/7/2024  L&D Warranty: 3/7/2024    Josesito Tamayo was seen for a hearing aid check following ENT and audiometric evaluation appts.  Listening check revealed fair working function of devices.  As patient's warranty is going to  in March, decided to send devices in for repair for a deep cleaning and checking.  When devices arrive back from repair, will schedule patient to return to the office, switch to closed domes, run feedback manager, and ensure they are connected to his phone and janine. Patient satisfied.    N/c under warranty    Sanjay Arreola, CCC-A    Appt time: 9:00 - 9:30 AM

## 2024-01-23 ENCOUNTER — CLINICAL SUPPORT (OUTPATIENT)
Dept: AUDIOLOGY | Facility: CLINIC | Age: 84
End: 2024-01-23
Payer: MEDICARE

## 2024-01-23 ENCOUNTER — OFFICE VISIT (OUTPATIENT)
Dept: OTOLARYNGOLOGY | Facility: CLINIC | Age: 84
End: 2024-01-23
Payer: MEDICARE

## 2024-01-23 VITALS — HEIGHT: 70 IN | BODY MASS INDEX: 25.05 KG/M2 | WEIGHT: 175 LBS

## 2024-01-23 DIAGNOSIS — H90.3 SENSORINEURAL HEARING LOSS (SNHL) OF BOTH EARS: Primary | ICD-10-CM

## 2024-01-23 DIAGNOSIS — H93.13 TINNITUS OF BOTH EARS: ICD-10-CM

## 2024-01-23 PROCEDURE — 1160F RVW MEDS BY RX/DR IN RCRD: CPT | Performed by: STUDENT IN AN ORGANIZED HEALTH CARE EDUCATION/TRAINING PROGRAM

## 2024-01-23 PROCEDURE — 1125F AMNT PAIN NOTED PAIN PRSNT: CPT | Performed by: STUDENT IN AN ORGANIZED HEALTH CARE EDUCATION/TRAINING PROGRAM

## 2024-01-23 PROCEDURE — HRANC PR HEARING AID NO CHARGE: Performed by: AUDIOLOGIST

## 2024-01-23 PROCEDURE — 1036F TOBACCO NON-USER: CPT | Performed by: STUDENT IN AN ORGANIZED HEALTH CARE EDUCATION/TRAINING PROGRAM

## 2024-01-23 PROCEDURE — 92557 COMPREHENSIVE HEARING TEST: CPT | Performed by: AUDIOLOGIST

## 2024-01-23 PROCEDURE — 92567 TYMPANOMETRY: CPT | Performed by: AUDIOLOGIST

## 2024-01-23 PROCEDURE — 1159F MED LIST DOCD IN RCRD: CPT | Performed by: STUDENT IN AN ORGANIZED HEALTH CARE EDUCATION/TRAINING PROGRAM

## 2024-01-23 PROCEDURE — 1157F ADVNC CARE PLAN IN RCRD: CPT | Performed by: STUDENT IN AN ORGANIZED HEALTH CARE EDUCATION/TRAINING PROGRAM

## 2024-01-23 PROCEDURE — 99204 OFFICE O/P NEW MOD 45 MIN: CPT | Performed by: STUDENT IN AN ORGANIZED HEALTH CARE EDUCATION/TRAINING PROGRAM

## 2024-01-23 NOTE — PROGRESS NOTES
Assessment  Bilateral sensorineural hearing loss    Plan  The condition was reviewed and explained, audiogram performed today notable for bilateral sensorineural hearing loss with slight progression when compared to audiogram performed 1 year ago.  He is using hearing aids with benefit.    We will monitor his hearing with yearly audiograms.  RTC 1y    HPI  Josesito Tamayo is a 83 y.o. male presenting for initial evaluation of hearing loss.  The patient reports developing bilateral progressive hearing loss for years, uses hearing aids with benefit.  Occasionally has difficulty understanding speech in high background noise environments.   Endorses occasional bilateral high-frequency tinnitus.  Reports history of high intensity noise exposure (shooting guns).  Denies ear pain, vertigo, discharge from ear, aural fullness or autophony.   Denies history of prior ear surgery.    Past Medical History:   Diagnosis Date    Bursitis of unspecified shoulder 05/19/2020    Scapulothoracic bursitis    Candidiasis of skin and nail 05/22/2019    Candidal dermatitis    Chronic rhinitis 10/09/2017    Rhinitis    Disorder of male genital organs, unspecified 07/11/2016    Disorder of scrotum    Dysuria 04/19/2023    Encounter for immunization 06/15/2021    Encounter for immunization    Encounter for immunization     Immunization due    Encounter for other preprocedural examination 11/16/2022    Preoperative clearance    Encounter for screening for depression 03/02/2022    Screening for depression    Laceration without foreign body of unspecified finger without damage to nail, initial encounter 06/15/2021    Finger laceration    Other conditions influencing health status 10/09/2017    History of cough    Other difficulties with micturition 07/11/2016    Abnormal urination    Other difficulties with micturition 07/11/2016    Abnormal urinary stream    Other shoulder lesions, unspecified shoulder 04/22/2015    Rotator cuff tendinitis     Other specified disorders of ear, unspecified ear 11/27/2017    Ear fullness    Other specified disorders of eustachian tube, right ear 11/10/2017    Dysfunction of right eustachian tube    Other specified personal risk factors, not elsewhere classified 11/25/2019    Pneumococcal vaccination indicated    Other specified personal risk factors, not elsewhere classified 11/21/2018    Pneumococcal vaccination indicated    Other symptoms and signs involving the genitourinary system 07/11/2016    Abnormal prostate exam    Pain in unspecified knee 01/05/2015    Knee pain    Personal history of malignant neoplasm of prostate 12/28/2020    History of prostate cancer    Personal history of other diseases of the circulatory system     History of hypertension    Personal history of other diseases of the circulatory system     Personal history of cardiac murmur    Personal history of other diseases of the nervous system and sense organs 11/27/2017    History of eustachian tube dysfunction    Personal history of other endocrine, nutritional and metabolic disease     History of high cholesterol    Personal history of other infectious and parasitic diseases     History of mumps    Personal history of other specified conditions 02/20/2018    History of urinary frequency    Personal history of other specified conditions     History of urinary frequency    Personal history of other specified conditions 07/21/2022    History of dysuria    Personal history of other specified conditions 04/01/2015    History of gross hematuria    Personal history of urinary (tract) infections 08/10/2018    History of recurrent urinary tract infection    Personal history of urinary (tract) infections 07/11/2016    History of urinary tract infection    Sudden idiopathic hearing loss, right ear 12/01/2017    Sudden hearing loss, right    Unspecified symptoms and signs involving the genitourinary system 07/11/2016    Urinary symptom or sign       Past  Surgical History:   Procedure Laterality Date    CATARACT EXTRACTION  12/01/2017    Cataract Surgery    COLONOSCOPY  12/13/2017    Complete Colonoscopy    HERNIA REPAIR  03/08/2013    Inguinal Hernia Repair    NOSE SURGERY  03/08/2013    Nose Surgery    OTHER SURGICAL HISTORY  03/08/2013    Dilation Of Male Urethral Stricture    OTHER SURGICAL HISTORY  11/17/2022    Cardiac catheterization         Current Outpatient Medications on File Prior to Visit   Medication Sig Dispense Refill    aspirin 81 mg EC tablet Take 1 tablet (81 mg) by mouth once daily.      cholecalciferol (Vitamin D-3) 50 mcg (2,000 unit) capsule Take 1 capsule (50 mcg) by mouth once daily.      metoprolol succinate XL (Toprol-XL) 25 mg 24 hr tablet Take 1 tablet (25 mg) by mouth once daily.      pravastatin (Pravachol) 40 mg tablet Take 1 tablet by mouth once daily 90 tablet 3    [DISCONTINUED] doxylamine-DM-acetaminophen (Nyquil) 12.5- mg/30 mL liquid liquid       [DISCONTINUED] furosemide (Lasix) 20 mg tablet Take 1 tablet (20 mg) by mouth once daily.      [DISCONTINUED] lisinopril 5 mg tablet Take 1 tablet (5 mg) by mouth once daily.      [DISCONTINUED] rosuvastatin (Crestor) 10 mg tablet Take 1 tablet (10 mg) by mouth once daily.      [DISCONTINUED] solifenacin (VESIcare) 5 mg tablet Take 1 tablet (5 mg) by mouth once daily.       No current facility-administered medications on file prior to visit.        Allergies   Allergen Reactions    Losartan Other     Joint Pain     Pravastatin Myalgia        Review of Systems  A detailed 12 point ROS was performed and is negative except as noted in the intake form, HPI and/or Past Medical History        Physical Exam   CONSTITUTIONAL: Well developed, well nourished.  VOICE: Normal voice quality  RESPIRATION: Breathing comfortably, no stridor.  CV: No clubbing/cyanosis/edema in hands.  EYES: EOM Intact, sclera normal.  NEURO: Alert and oriented times 3, Cranial nerves V,VII intact and symmetric  bilaterally.  HEAD AND FACE: Symmetric facial features, no masses or lesions, sinuses nontender to palpation.  SALIVARY GLANDS: Parotid and submandibular glands normal bilaterally.  EARS: Normal external ears, external auditory canals, and TMs to otoscopy  NOSE: External nose midline, anterior rhinoscopy is normal with limited visualization to the anterior aspect of the interior turbinates. No lesions noted.  ORAL CAVITY/OROPHARYNX/LIPS: Normal mucous membranes, normal floor of mouth/tongue/OP, no masses or lesions are noted.  PHARYNGEAL WALLS AND NASOPHARYNX: No masses noted. Mucosa appears clean and moist  NECK/LYMPH: No LAD, no thyroid masses. Trachea palpably midline  SKIN: Neck skin is without injury  PSYCH: Alert and oriented with appropriate mood and affect     Results:  Dr. Najera note 12/10/2019 reviewed  Audiogram 1/16/2023 reviewed  Audiogram performed today reviewed showing bilateral normal/mild sloping to severe sensorineural hearing loss.  Speech discrimination score was 84% on the right ear and 88% on the left.  Tympanograms: Right type A, left no seal.

## 2024-01-23 NOTE — PROGRESS NOTES
[facility]  AUDIOMETRIC EVALUATION      Name:  Josesito Tamayo   :  1940  Age:  83 y.o.  Date of Evaluation:  24    HISTORY    Josesito Tamayo is seen today at the request of Ziyad Law M.D.    EVALUATION  See scanned audiogram in Media and included at the end of this report.    RESULTS    Otoscopic Evaluation:  Right Ear:  clear   Left Ear:  clear    Tympanometry:   Right Ear:  Type A, consistent with normal eardrum mobility and middle ear pressure   Left Ear:  could not be completed due to inability to maintain seal    Ipsilateral acoustic reflexes were not completed    Pure Tone Audiometry:    Right Ear:  normal to profound sensorineural hearing loss  Left Ear:  normal to profound sensorineural hearing loss       Speech Audiometry:    Right Ear:  good in quiet at an elevated presentation level  Left Ear:  good in quiet at an elevated presentation level  Speech reception thresholds were in good agreement with pure tone testing.    DISCUSSION  Results were relayed to Ziyad Law M.D.    APPOINTMENT TIME  8:30-9:00am      Gilmer Castaneda  Doctor of Audiology  Licensed Audiologist

## 2024-01-30 ENCOUNTER — LAB (OUTPATIENT)
Dept: LAB | Facility: LAB | Age: 84
End: 2024-01-30
Payer: MEDICARE

## 2024-01-30 ENCOUNTER — OFFICE VISIT (OUTPATIENT)
Dept: PRIMARY CARE | Facility: CLINIC | Age: 84
End: 2024-01-30
Payer: MEDICARE

## 2024-01-30 VITALS
RESPIRATION RATE: 16 BRPM | TEMPERATURE: 96.4 F | HEART RATE: 78 BPM | DIASTOLIC BLOOD PRESSURE: 74 MMHG | SYSTOLIC BLOOD PRESSURE: 157 MMHG | WEIGHT: 176.7 LBS | OXYGEN SATURATION: 96 % | BODY MASS INDEX: 25.35 KG/M2

## 2024-01-30 DIAGNOSIS — I71.21 ANEURYSM OF ASCENDING AORTA WITHOUT RUPTURE (CMS-HCC): ICD-10-CM

## 2024-01-30 DIAGNOSIS — Z13.89 SCREENING FOR MULTIPLE CONDITIONS: ICD-10-CM

## 2024-01-30 DIAGNOSIS — N18.31 ANEMIA DUE TO STAGE 3A CHRONIC KIDNEY DISEASE (MULTI): ICD-10-CM

## 2024-01-30 DIAGNOSIS — Z95.818 STATUS POST IMPLANTATION OF MITRAL VALVE LEAFLET CLIP: ICD-10-CM

## 2024-01-30 DIAGNOSIS — R01.1 HEART MURMUR: ICD-10-CM

## 2024-01-30 DIAGNOSIS — I10 PRIMARY HYPERTENSION: ICD-10-CM

## 2024-01-30 DIAGNOSIS — I10 BENIGN ESSENTIAL HYPERTENSION: ICD-10-CM

## 2024-01-30 DIAGNOSIS — D63.1 ANEMIA DUE TO STAGE 3A CHRONIC KIDNEY DISEASE (MULTI): ICD-10-CM

## 2024-01-30 DIAGNOSIS — E55.9 VITAMIN D DEFICIENCY: ICD-10-CM

## 2024-01-30 DIAGNOSIS — Z98.890 STATUS POST IMPLANTATION OF MITRAL VALVE LEAFLET CLIP: ICD-10-CM

## 2024-01-30 DIAGNOSIS — I50.20 HFREF (HEART FAILURE WITH REDUCED EJECTION FRACTION) (MULTI): ICD-10-CM

## 2024-01-30 DIAGNOSIS — N18.31 STAGE 3A CHRONIC KIDNEY DISEASE (MULTI): ICD-10-CM

## 2024-01-30 DIAGNOSIS — I34.0 NONRHEUMATIC MITRAL VALVE REGURGITATION: ICD-10-CM

## 2024-01-30 DIAGNOSIS — Z00.00 ROUTINE GENERAL MEDICAL EXAMINATION AT HEALTH CARE FACILITY: Primary | ICD-10-CM

## 2024-01-30 DIAGNOSIS — E78.2 MIXED HYPERLIPIDEMIA: ICD-10-CM

## 2024-01-30 DIAGNOSIS — Z78.9 SELF-CATHETERIZES URINARY BLADDER: ICD-10-CM

## 2024-01-30 DIAGNOSIS — Z85.46 HISTORY OF PROSTATE CANCER: ICD-10-CM

## 2024-01-30 PROBLEM — Z86.79 HISTORY OF HYPERTENSION: Status: RESOLVED | Noted: 2024-01-20 | Resolved: 2024-01-30

## 2024-01-30 PROBLEM — Z86.19 HISTORY OF MUMPS: Status: RESOLVED | Noted: 2024-01-20 | Resolved: 2024-01-30

## 2024-01-30 PROBLEM — N99.114 POSTPROCEDURAL MALE URETHRAL STRICTURE: Status: ACTIVE | Noted: 2023-04-19

## 2024-01-30 PROBLEM — C61 MALIGNANT NEOPLASM OF PROSTATE (MULTI): Status: RESOLVED | Noted: 2024-01-20 | Resolved: 2024-01-30

## 2024-01-30 LAB
25(OH)D3 SERPL-MCNC: 34 NG/ML (ref 30–100)
ALBUMIN SERPL BCP-MCNC: 4.5 G/DL (ref 3.4–5)
ALP SERPL-CCNC: 51 U/L (ref 33–136)
ALT SERPL W P-5'-P-CCNC: 14 U/L (ref 10–52)
ANION GAP SERPL CALC-SCNC: 12 MMOL/L (ref 10–20)
AST SERPL W P-5'-P-CCNC: 16 U/L (ref 9–39)
BASOPHILS # BLD AUTO: 0.02 X10*3/UL (ref 0–0.1)
BASOPHILS NFR BLD AUTO: 0.3 %
BILIRUB SERPL-MCNC: 1.8 MG/DL (ref 0–1.2)
BUN SERPL-MCNC: 29 MG/DL (ref 6–23)
CALCIUM SERPL-MCNC: 10.2 MG/DL (ref 8.6–10.6)
CHLORIDE SERPL-SCNC: 102 MMOL/L (ref 98–107)
CHOLEST SERPL-MCNC: 201 MG/DL (ref 0–199)
CHOLESTEROL/HDL RATIO: 3.5
CO2 SERPL-SCNC: 26 MMOL/L (ref 21–32)
CREAT SERPL-MCNC: 1.34 MG/DL (ref 0.5–1.3)
EGFRCR SERPLBLD CKD-EPI 2021: 53 ML/MIN/1.73M*2
EOSINOPHIL # BLD AUTO: 0.14 X10*3/UL (ref 0–0.4)
EOSINOPHIL NFR BLD AUTO: 2 %
ERYTHROCYTE [DISTWIDTH] IN BLOOD BY AUTOMATED COUNT: 15 % (ref 11.5–14.5)
GLUCOSE SERPL-MCNC: 97 MG/DL (ref 74–99)
HCT VFR BLD AUTO: 45.2 % (ref 41–52)
HDLC SERPL-MCNC: 56.8 MG/DL
HGB BLD-MCNC: 14.3 G/DL (ref 13.5–17.5)
IMM GRANULOCYTES # BLD AUTO: 0.02 X10*3/UL (ref 0–0.5)
IMM GRANULOCYTES NFR BLD AUTO: 0.3 % (ref 0–0.9)
LDLC SERPL CALC-MCNC: 125 MG/DL
LYMPHOCYTES # BLD AUTO: 1.79 X10*3/UL (ref 0.8–3)
LYMPHOCYTES NFR BLD AUTO: 26.1 %
MCH RBC QN AUTO: 27.1 PG (ref 26–34)
MCHC RBC AUTO-ENTMCNC: 31.6 G/DL (ref 32–36)
MCV RBC AUTO: 86 FL (ref 80–100)
MONOCYTES # BLD AUTO: 0.76 X10*3/UL (ref 0.05–0.8)
MONOCYTES NFR BLD AUTO: 11.1 %
NEUTROPHILS # BLD AUTO: 4.14 X10*3/UL (ref 1.6–5.5)
NEUTROPHILS NFR BLD AUTO: 60.2 %
NON HDL CHOLESTEROL: 144 MG/DL (ref 0–149)
NRBC BLD-RTO: 0 /100 WBCS (ref 0–0)
PLATELET # BLD AUTO: 194 X10*3/UL (ref 150–450)
POTASSIUM SERPL-SCNC: 5.2 MMOL/L (ref 3.5–5.3)
PROT SERPL-MCNC: 7.1 G/DL (ref 6.4–8.2)
RBC # BLD AUTO: 5.28 X10*6/UL (ref 4.5–5.9)
SODIUM SERPL-SCNC: 135 MMOL/L (ref 136–145)
TRIGL SERPL-MCNC: 95 MG/DL (ref 0–149)
VLDL: 19 MG/DL (ref 0–40)
WBC # BLD AUTO: 6.9 X10*3/UL (ref 4.4–11.3)

## 2024-01-30 PROCEDURE — G0439 PPPS, SUBSEQ VISIT: HCPCS | Performed by: FAMILY MEDICINE

## 2024-01-30 PROCEDURE — 1160F RVW MEDS BY RX/DR IN RCRD: CPT | Performed by: FAMILY MEDICINE

## 2024-01-30 PROCEDURE — 82306 VITAMIN D 25 HYDROXY: CPT

## 2024-01-30 PROCEDURE — 1170F FXNL STATUS ASSESSED: CPT | Performed by: FAMILY MEDICINE

## 2024-01-30 PROCEDURE — 1036F TOBACCO NON-USER: CPT | Performed by: FAMILY MEDICINE

## 2024-01-30 PROCEDURE — 3077F SYST BP >= 140 MM HG: CPT | Performed by: FAMILY MEDICINE

## 2024-01-30 PROCEDURE — 1159F MED LIST DOCD IN RCRD: CPT | Performed by: FAMILY MEDICINE

## 2024-01-30 PROCEDURE — 36415 COLL VENOUS BLD VENIPUNCTURE: CPT

## 2024-01-30 PROCEDURE — 80061 LIPID PANEL: CPT

## 2024-01-30 PROCEDURE — 1125F AMNT PAIN NOTED PAIN PRSNT: CPT | Performed by: FAMILY MEDICINE

## 2024-01-30 PROCEDURE — 1123F ACP DISCUSS/DSCN MKR DOCD: CPT | Performed by: FAMILY MEDICINE

## 2024-01-30 PROCEDURE — 80053 COMPREHEN METABOLIC PANEL: CPT

## 2024-01-30 PROCEDURE — 3078F DIAST BP <80 MM HG: CPT | Performed by: FAMILY MEDICINE

## 2024-01-30 PROCEDURE — G0444 DEPRESSION SCREEN ANNUAL: HCPCS | Performed by: FAMILY MEDICINE

## 2024-01-30 PROCEDURE — 85025 COMPLETE CBC W/AUTO DIFF WBC: CPT

## 2024-01-30 PROCEDURE — 1157F ADVNC CARE PLAN IN RCRD: CPT | Performed by: FAMILY MEDICINE

## 2024-01-30 PROCEDURE — 1158F ADVNC CARE PLAN TLK DOCD: CPT | Performed by: FAMILY MEDICINE

## 2024-01-30 RX ORDER — LISINOPRIL 5 MG/1
5 TABLET ORAL DAILY
COMMUNITY
End: 2024-03-28 | Stop reason: SDUPTHER

## 2024-01-30 ASSESSMENT — ENCOUNTER SYMPTOMS
SHORTNESS OF BREATH: 0
PALPITATIONS: 0
RHINORRHEA: 1
HEADACHES: 0
HEMATURIA: 0
FATIGUE: 0

## 2024-01-30 ASSESSMENT — ACTIVITIES OF DAILY LIVING (ADL)
TAKING_MEDICATION: INDEPENDENT
BATHING: INDEPENDENT
DRESSING: INDEPENDENT
DOING_HOUSEWORK: INDEPENDENT
MANAGING_FINANCES: INDEPENDENT
GROCERY_SHOPPING: INDEPENDENT

## 2024-01-30 ASSESSMENT — PATIENT HEALTH QUESTIONNAIRE - PHQ9
2. FEELING DOWN, DEPRESSED OR HOPELESS: NOT AT ALL
SUM OF ALL RESPONSES TO PHQ9 QUESTIONS 1 AND 2: 0
1. LITTLE INTEREST OR PLEASURE IN DOING THINGS: NOT AT ALL

## 2024-01-30 NOTE — ASSESSMENT & PLAN NOTE
Blood pressure in office:  BP Readings from Last 1 Encounters:   01/30/24 157/74   The goal range for blood pressure is below 130/80.   We will continue to monitor.

## 2024-01-30 NOTE — PROGRESS NOTES
Subjective   Reason for Visit: Josesito Tamayo is an 83 y.o. male here for a Medicare Wellness visit.      Flu shot- discuss     Take 2 Bp meds but other is not on med list and pt does not remember. Thinks it might be Lisinopril.     Reviewed all medications by prescribing practitioner or clinical pharmacist (such as prescriptions, OTCs, herbal therapies and supplements) and documented in the medical record.High blood pressure evaluation.     Review of symptoms:  Fatigue: n  Headaches:  n  Blurred vision:  n  Palpitations: n  Chest pains: n   Shortness of Breath: n  Swelling of legs: n  Blood in urine: n   Taking medications daily: y  Medication side effects: n  Problems with medication compliance: n   Baby Aspirin 81 mg daily:  y     High cholesterol evaluation.     Supplements: y   specific diet plan: n   exercising 30 min daily?: Walking     Fatigue:  n  Chest pains:  n   Shortness of Breath:  n  Sudden Headaches: n   Vision loss: n   Syncope (fainting): n  Leg Claudication (limping/leg tiredness): n   Taking medication daily: y   Medication side effects:N     HPI    Patient Care Team:  Ronnie Kay MD as PCP - General  Ronnie Kay MD as PCP - [employer] PCP     Review of Systems   Constitutional:  Negative for fatigue.   Respiratory:  Negative for shortness of breath.    Cardiovascular:  Negative for chest pain, palpitations and leg swelling.   Genitourinary:  Negative for hematuria.   Neurological:  Negative for syncope and headaches.       Objective   Vitals:  /74 (BP Location: Left arm, Patient Position: Sitting, BP Cuff Size: Adult)   Pulse 78   Temp 35.8 °C (96.4 °F) (Temporal)   Resp 16   Wt 80.2 kg (176 lb 11.2 oz)   SpO2 96%   BMI 25.35 kg/m²       Physical Exam    Assessment/Plan   Problem List Items Addressed This Visit       HFrEF (heart failure with reduced ejection fraction) (CMS/[facility])    Current Assessment & Plan     Minimal plaquing on a cardiac catheterization from  October 2022. EF 40%. Optimization of GDMT is being pursued.  continue with lisinopril metoprolol succinate. Not tolerate increased dose of metoprolol succinate due to bradycardia.    reluctant to increase the dose of lisinopril as his creatinine remains at 1.3.   Lasix reduced for creatinine - Dr Jackson recommended wean to as needed use at last visit         Nonrheumatic mitral valve regurgitation    Current Assessment & Plan     Per Dr Jackson monitoring  Severe. Prolapse of the P2 segment of the posterior leaflet. He had 2 mitral clips placed December 2022. At the end of the procedure he had trace to mild regurgitation.   echo in December 2022 revealed mitral regurgitation was moderate with an EF of 40%.   March 2023  repeat echo his mitral regurgitation remained mild and anteriorly directed.   He continues to have a significant murmur on exam. He feels terrific. A repeat echo will be done March 2024.         Status post implantation of mitral valve leaflet clip    Current Assessment & Plan     Underwent MitraClip procedure in December 2022.   Follows with [facility] and cardiology Dr Jackson         Primary hypertension     Other Visit Diagnoses       Routine general medical examination at health care facility    -  Primary    Screening for multiple conditions                Scribe Attestation  By signing my name below, I, Kareen Zhang , Scribe   attest that this documentation has been prepared under the direction and in the presence of Ronnie Kay MD.

## 2024-01-30 NOTE — ASSESSMENT & PLAN NOTE
Minimal plaquing on a cardiac catheterization from October 2022. EF 40%. Optimization of GDMT is being pursued.  continue with lisinopril metoprolol succinate. Not tolerate increased dose of metoprolol succinate due to bradycardia.    reluctant to increase the dose of lisinopril as his creatinine remains at 1.3.   Lasix reduced for creatinine - Dr Jackson recommended wean to as needed use at last visit

## 2024-01-30 NOTE — ASSESSMENT & PLAN NOTE
Underwent MitraClip procedure in December 2022.   Follows with [facility] and cardiology Dr Jackson

## 2024-01-30 NOTE — PROGRESS NOTES
Subjective   Reason for Visit: Josesito Tamayo is an 83 y.o. male here for a Medicare Wellness visit.          Reviewed all medications by prescribing practitioner or clinical pharmacist (such as prescriptions, OTCs, herbal therapies and supplements) and documented in the medical record.    He is seeing Dr. Jackson for a check up on 2/15/24. He had an echo done in December 2023. He reports feeling great, he does not feel any heart symptoms. He exercises by walking regularly and does not feel short of breath. He is checking his blood pressure at home because Dr. Jackson asks for an overview of readings in between appointments.   He does still need to use a catheter, if he does not use one he does experience mild incontinence. He does wear thin pads to catch any urine.   His only complaint is constantly needing to clear his throat. He reports that the cough he has had for a few months has become better, but he is also experiencing nasal drainage.         Patient Care Team:  Ronnie Kay MD as PCP - General  Ronnie Kay MD as PCP - [employer] PCP     Review of Systems   Constitutional:  Negative for fatigue.   HENT:  Positive for rhinorrhea.    Respiratory:  Negative for shortness of breath.    Cardiovascular:  Negative for chest pain, palpitations and leg swelling.   Genitourinary:  Negative for hematuria.        Mild incontinence   Neurological:  Negative for syncope and headaches.       Objective   Vitals:  /74 (BP Location: Left arm, Patient Position: Sitting, BP Cuff Size: Adult)   Pulse 78   Temp 35.8 °C (96.4 °F) (Temporal)   Resp 16   Wt 80.2 kg (176 lb 11.2 oz)   SpO2 96%   BMI 25.35 kg/m²       Physical Exam  Constitutional:       Appearance: Normal appearance.   HENT:      Nose: Rhinorrhea present.      Mouth/Throat:      Mouth: Mucous membranes are moist.      Pharynx: Oropharynx is clear. No posterior oropharyngeal erythema.   Eyes:      Conjunctiva/sclera: Conjunctivae normal.    Cardiovascular:      Rate and Rhythm: Normal rate.      Pulses: Normal pulses.      Heart sounds: Murmur heard.      Crescendo systolic murmur is present with a grade of 3/6.      Comments: Murmur heard best LLSB  Pulmonary:      Effort: Pulmonary effort is normal.      Breath sounds: Normal breath sounds.   Neurological:      General: No focal deficit present.      Mental Status: He is alert.   Psychiatric:         Mood and Affect: Mood normal.         Behavior: Behavior normal.         Thought Content: Thought content normal.         Judgment: Judgment normal.         Assessment/Plan   Problem List Items Addressed This Visit       Stage 3a chronic kidney disease (CMS/[facility])    Current Assessment & Plan     GFR sable @55 7/2023  Repeat metabolic panel today           Relevant Orders    Comprehensive Metabolic Panel    Aortic aneurysm, thoracic (CMS/[facility])    Current Assessment & Plan     Stable.  No increase  in size per echocardiogram 12/2023 vs March 2023.  (3.8cm on 12/2023 vs 4.4 3/2023)  Continue to monitor with imaging yearly          Benign essential hypertension    Current Assessment & Plan     Hypertension:  Blood pressure in office today was   BP Readings from Last 1 Encounters:   01/30/24 157/74   Home readings reported to be <130/80  The goal range for blood pressure is below 130/80.   We will continue to monitor.   Continue lisinopril 5 mg and metoprolol 25 mg daily.  Suspect lisinopril causing throat clearing and mild dry cough - but he did not tolerate losartan in past  He is not bothered by these symptoms for med change         Heart murmur    Current Assessment & Plan     Due to MVP         HFrEF (heart failure with reduced ejection fraction) (CMS/[facility])    Current Assessment & Plan     Minimal plaquing on a cardiac catheterization from October 2022. EF 40%. Optimization of GDMT is being pursued.  continue with lisinopril metoprolol succinate. Not tolerate increased dose of metoprolol succinate due to  bradycardia.    reluctant to increase the dose of lisinopril as his creatinine remains at 1.3.   Lasix reduced for creatinine - Dr Jackson recommended wean to as needed use at last visit         History of prostate cancer    Current Assessment & Plan     status post brachii therapy 2005  PSA have consistently been undetectable  Due for psa today          Nonrheumatic mitral valve regurgitation    Current Assessment & Plan     Per Dr Jacksno monitoring  Severe. Prolapse of the P2 segment of the posterior leaflet. He had 2 mitral clips placed December 2022. At the end of the procedure he had trace to mild regurgitation.   echo in December 2022 revealed mitral regurgitation was moderate with an EF of 40%.   March 2023  repeat echo his mitral regurgitation remained mild and anteriorly directed.   He continues to have a significant murmur on exam. He feels terrific. A repeat echo will be done March 2024.         Status post implantation of mitral valve leaflet clip    Current Assessment & Plan     Underwent MitraClip procedure in December 2022.   Follows with [facility] and cardiology Dr Jackson         Self-catheterizes urinary bladder    Current Assessment & Plan     Intermittent need  No change in frequency or severity of symptoms           Primary hypertension    Current Assessment & Plan     Blood pressure in office:  BP Readings from Last 1 Encounters:   01/30/24 157/74   The goal range for blood pressure is below 130/80.   We will continue to monitor.          Relevant Orders    Comprehensive Metabolic Panel    Anemia due to stage 3a chronic kidney disease (CMS/HCC)    Current Assessment & Plan     Check CBC for status  Historically mild  Lab Results   Component Value Date    WBC 5.5 07/25/2023    HGB 13.1 (L) 07/25/2023    HCT 40.5 (L) 07/25/2023    MCV 87 07/25/2023     07/25/2023            Relevant Orders    CBC and Auto Differential     Other Visit Diagnoses       Routine general medical examination at  health care facility    -  Primary    Screening for multiple conditions        Mixed hyperlipidemia        Relevant Orders    Lipid Panel    Vitamin D deficiency        Relevant Orders    Vitamin D 25-Hydroxy,Total (for eval of Vitamin D levels)

## 2024-01-30 NOTE — ASSESSMENT & PLAN NOTE
Hypertension:  Blood pressure in office today was   BP Readings from Last 1 Encounters:   01/30/24 157/74   Home readings reported to be <130/80  The goal range for blood pressure is below 130/80.   We will continue to monitor.   Continue lisinopril 5 mg and metoprolol 25 mg daily.  Suspect lisinopril causing throat clearing and mild dry cough - but he did not tolerate losartan in past  He is not bothered by these symptoms for med change

## 2024-01-30 NOTE — ASSESSMENT & PLAN NOTE
Check CBC for status  Historically mild  Lab Results   Component Value Date    WBC 5.5 07/25/2023    HGB 13.1 (L) 07/25/2023    HCT 40.5 (L) 07/25/2023    MCV 87 07/25/2023     07/25/2023

## 2024-01-30 NOTE — ASSESSMENT & PLAN NOTE
Stable.  No increase  in size per echocardiogram 12/2023 vs March 2023.  (3.8cm on 12/2023 vs 4.4 3/2023)  Continue to monitor with imaging yearly

## 2024-01-30 NOTE — ASSESSMENT & PLAN NOTE
Per Dr Jackson monitoring  Severe. Prolapse of the P2 segment of the posterior leaflet. He had 2 mitral clips placed December 2022. At the end of the procedure he had trace to mild regurgitation.   echo in December 2022 revealed mitral regurgitation was moderate with an EF of 40%.   March 2023  repeat echo his mitral regurgitation remained mild and anteriorly directed.   He continues to have a significant murmur on exam. He feels terrific. A repeat echo will be done March 2024.

## 2024-02-01 ENCOUNTER — CLINICAL SUPPORT (OUTPATIENT)
Dept: AUDIOLOGY | Facility: CLINIC | Age: 84
End: 2024-02-01
Payer: MEDICARE

## 2024-02-01 DIAGNOSIS — H90.3 SENSORINEURAL HEARING LOSS (SNHL) OF BOTH EARS: Primary | ICD-10-CM

## 2024-02-01 PROCEDURE — HRANC PR HEARING AID NO CHARGE: Performed by: AUDIOLOGIST

## 2024-02-01 NOTE — PROGRESS NOTES
HEARING AID CHECK    RIGHT: Phonak Audeo P50-R SN: [id number]  : 1 x M  Wax Guard/Dome: Cerushield; Large closed  LEFT: Phonak Audeo P50-R SN: [id number]  : 1 x M  Wax Guard/Dome: Cerushield; Large oclosed    Repair Warranty: 3/7/2024  L&D Warranty: 3/7/2024    Josesito Tamayo was seen for a hearing aid check to  his repaired devices. Switched to closed domes, ran feedback manager, and ensured they were connected to his phone and janine. Patient noted that all was going well and he was happy to wait until January for next appt.  Cancelled appt in July. Patient satisfied.    N/c under warranty    Sanjay Arreola, CCC-A    Appt time: 9:00 - 9:30 AM

## 2024-02-12 PROBLEM — I34.0 NONRHEUMATIC MITRAL VALVE REGURGITATION: Chronic | Status: ACTIVE | Noted: 2023-04-19

## 2024-02-12 PROBLEM — I71.20 AORTIC ANEURYSM, THORACIC (CMS-HCC): Chronic | Status: ACTIVE | Noted: 2023-04-19

## 2024-02-12 PROBLEM — E78.5 HYPERLIPIDEMIA: Chronic | Status: ACTIVE | Noted: 2024-02-12

## 2024-02-14 PROBLEM — Z98.890 STATUS POST IMPLANTATION OF MITRAL VALVE LEAFLET CLIP: Chronic | Status: ACTIVE | Noted: 2023-04-19

## 2024-02-14 PROBLEM — I42.9 CARDIOMYOPATHY, IDIOPATHIC (MULTI): Chronic | Status: ACTIVE | Noted: 2024-02-14

## 2024-02-14 PROBLEM — Z95.818 STATUS POST IMPLANTATION OF MITRAL VALVE LEAFLET CLIP: Chronic | Status: ACTIVE | Noted: 2023-04-19

## 2024-02-14 PROBLEM — I49.3 FREQUENT PVCS: Chronic | Status: ACTIVE | Noted: 2023-04-19

## 2024-02-14 PROBLEM — I10 BENIGN ESSENTIAL HYPERTENSION: Chronic | Status: ACTIVE | Noted: 2023-04-19

## 2024-02-14 NOTE — PROGRESS NOTES
Referred by No ref. provider found    HPI I am seeing Josesito for 6-month follow-up.  He feels great.  No chest pain no pressure no heaviness no shortness of breath no palpitations.    Past Medical History:  Problem List Items Addressed This Visit    None       Past Medical History:   Diagnosis Date    Aortic aneurysm, thoracic (CMS/[facility], located [facility]) 04/19/2023    4 cm per echo of 7/2020   4.4 cm per echo of 3/2023    Bursitis of unspecified shoulder 05/19/2020    Scapulothoracic bursitis    Candidiasis of skin and nail 05/22/2019    Candidal dermatitis    Chronic rhinitis 10/09/2017    Rhinitis    Disorder of male genital organs, unspecified 07/11/2016    Disorder of scrotum    Dysuria 04/19/2023    Encounter for immunization 06/15/2021    Encounter for immunization    Encounter for immunization     Immunization due    Encounter for other preprocedural examination 11/16/2022    Preoperative clearance    Encounter for screening for depression 03/02/2022    Screening for depression    Hyperlipidemia 02/12/2024    Dr. Kay follows    Laceration without foreign body of unspecified finger without damage to nail, initial encounter 06/15/2021    Finger laceration    Malignant neoplasm of prostate (CMS/[facility] located [facility]) 01/20/2024    Nonrheumatic mitral valve regurgitation 04/19/2023    P2 PMVL prolapse with severe ant-directed MR … EF dropped to 40-45% in 10/2022 on KATE   2 MitraClip 12/1/2022    Other conditions influencing health status 10/09/2017    History of cough    Other difficulties with micturition 07/11/2016    Abnormal urination    Other difficulties with micturition 07/11/2016    Abnormal urinary stream    Other shoulder lesions, unspecified shoulder 04/22/2015    Rotator cuff tendinitis    Other specified disorders of ear, unspecified ear 11/27/2017    Ear fullness    Other specified disorders of eustachian tube, right ear 11/10/2017    Dysfunction of right eustachian tube    Other specified personal risk factors, not elsewhere  classified 11/25/2019    Pneumococcal vaccination indicated    Other specified personal risk factors, not elsewhere classified 11/21/2018    Pneumococcal vaccination indicated    Other symptoms and signs involving the genitourinary system 07/11/2016    Abnormal prostate exam    Pain in unspecified knee 01/05/2015    Knee pain    Personal history of malignant neoplasm of prostate 12/28/2020    History of prostate cancer    Personal history of other diseases of the circulatory system     History of hypertension    Personal history of other diseases of the circulatory system     Personal history of cardiac murmur    Personal history of other diseases of the nervous system and sense organs 11/27/2017    History of eustachian tube dysfunction    Personal history of other endocrine, nutritional and metabolic disease     History of high cholesterol    Personal history of other infectious and parasitic diseases     History of mumps    Personal history of other specified conditions 02/20/2018    History of urinary frequency    Personal history of other specified conditions     History of urinary frequency    Personal history of other specified conditions 07/21/2022    History of dysuria    Personal history of other specified conditions 04/01/2015    History of gross hematuria    Personal history of urinary (tract) infections 08/10/2018    History of recurrent urinary tract infection    Personal history of urinary (tract) infections 07/11/2016    History of urinary tract infection    Sudden idiopathic hearing loss, 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      Social History:  He reports that he has never smoked. He has never used smokeless tobacco. He reports that he does not currently use alcohol. He reports that he does not use drugs.    Family History:  Family History   Problem Relation Name Age of Onset    Hyperlipidemia Other      Prostate cancer Other      Hypertension Other          Allergies:  Losartan and Pravastatin    Outpatient Medications:  Current Outpatient Medications   Medication Instructions    aspirin 81 mg EC tablet 1 tablet, oral, Daily    cholecalciferol (Vitamin D-3) 50 mcg (2,000 unit) capsule 1 capsule, oral, Daily    lisinopril 5 mg, oral, Daily    metoprolol succinate XL (Toprol-XL) 25 mg 24 hr tablet 1 tablet, oral, Daily    pravastatin (PRAVACHOL) 40 mg, oral, Daily        Last Recorded Vitals:  There were no vitals filed for this visit.    Physical Exam    Physical  Patient is alert and oriented x3.  HEENT is unremarkable mucous members are moist  Neck no JVP no bruits upstrokes are full no thyromegaly  Lungs are clear bilaterally.  No wheezing crackles or rales  Heart regular rhythm with frequent ectopy normal S1-S2 there is no S3 2/6 to 3/6 holosystolic murmur throughout the precordium loudest in the left axilla abdomen is soft vessels are positive nontender nondistended no organomegaly no pulsatile masses  Extremities have no edema.  Distal pulses present palpable.  Neuro is grossly nonfocal  Skin has no rashes     Last Labs:  CBC -  Lab Results   Component Value Date    WBC 6.9 01/30/2024    HGB 14.3 01/30/2024    HCT 45.2 01/30/2024    MCV 86 01/30/2024     01/30/2024       CMP -  Lab Results   Component Value Date    CALCIUM 10.2 01/30/2024    PHOS 4.7 12/02/2022    PROT 7.1 01/30/2024    ALBUMIN 4.5 01/30/2024    AST 16 01/30/2024    ALT 14 01/30/2024    ALKPHOS 51 01/30/2024    BILITOT 1.8 (H) 01/30/2024       LIPID PANEL -   Lab Results   Component Value Date    CHOL 201 (H) 01/30/2024    HDL 56.8  "01/30/2024    CHHDL 3.5 01/30/2024    VLDL 19 01/30/2024    TRIG 95 01/30/2024    NHDL 144 01/30/2024       RENAL FUNCTION PANEL -   Lab Results   Component Value Date    K 5.2 01/30/2024    PHOS 4.7 12/02/2022       No results found for: \"BNP\", \"HGBA1C\"       Procedure    Echo 12/22/23 EF 50%, Mod PIOTR, MVT, 2 mitraclips with mild MR and  transmitral gradient of 16mmHg    ECHO [03/07/2023]: Est EF 40%. SD = impaired relaxation pattern. LA mild-mod dial. Two mitral clips present. Mild anteriorly-directed MR present. Tricuspid valve is abnormal. Tricuspid valve leaflets thickened and redundant. AV sclerosis. TA mildly dial @ 4.4 cm.     ECHO [12/28/2022]: Est EF 40%. SD = impaired relaxation pattern. LA mod-severely dial. Mitral clip present. Residual moderate centrally-directed MR.     ECHO [12/02/2022]: EF 45-50%. SD = abnormal pattern. Elev mean LA pressure. Mildly reduced RVSF. LA severely dial. MV clips present w/trace-mild MR and no stenosis. RVSP mildly elev (34.8 mmHg). Mild AR. LV Global Longitudinal Strain -9.7%. Strain values abnormal, c/w impaired LV function. Global hypok. Of LV w/minor regional variations. Mod dial ascending aorta (4.5 cm).     MitraClip [12/01/2022, Dr. Caren Dominguez]: Successful percutanous edge to edge mitral valve repair with 2 MitraClip devices.      Left & Right Heart Cath [10/27/2022, Dr. Neftali Sosa]: Angiographically normal left main. 30% mid LAD stenosis after a large diagonal branch with luminal irregularities in the rest of the artery. Luminal irregularities throughout left circumflex system. Large dominant RCA with luminal irregularities throughout the vessel. Right dominant coronary artery system. Normal cardiac output. Elevated filling pressures. Conclusions: nonobstructive CAD     AA U/S (12/05/2018): No aneurysm.     Assessment/Plan   1. Mitral regurgitation. Severe. Prolapse of the P2 segment of the posterior leaflet. He had 2 mitral clips placed December 2022. " At the end of the procedure he had trace to mild regurgitation. His echo in December revealed mitral regurgitation was moderate with an EF of 40%. When I had seen him in March I was concerned because the murmur was very prominent in the axilla. On a repeat echo his mitral regurgitation remained mild and anteriorly directed.  A repeat. Echo 12/2023 EF 50%, mild MR, mild functional MS gradient of 16mmHg.  Again, his murmur mitral regurgitation is disproportionate to what I see.  I am concerned about an eccentric jet that she is not being picked up by [facility]TE.  Given his lack of symptoms we will follow clinically.  There is functional mitral stenosis     2. Nonischemic cardiomyopathy. Minimal plaquing on a cardiac catheterization from October 2022. EF 40%.  His most recent echo shows an ejection fraction of 50%.  Continue lisinopril metoprolol.  Unable to uptitrate the dose of metoprolol because of an underlying bradycardia.  Creatinine stable at 1.3.  He is totally off Lasix at this time    3. Frequent PVCs. Many are heard on examination today.     4. Hypertension.  Blood pressures at home are excellent    5. Acute kidney injury. His creatinine is stable at 1.3.  He is completely off of Lasix.  BUN 29 creatinine 1.34    Josesito doing great.  My plan is to see him back in 6 months.  I will see him sooner if any issues arise.    Lyndsay Jackson MD     Instructions and follow up

## 2024-02-15 ENCOUNTER — OFFICE VISIT (OUTPATIENT)
Dept: CARDIOLOGY | Facility: CLINIC | Age: 84
End: 2024-02-15
Payer: MEDICARE

## 2024-02-15 VITALS
OXYGEN SATURATION: 98 % | WEIGHT: 172 LBS | DIASTOLIC BLOOD PRESSURE: 76 MMHG | HEIGHT: 70 IN | BODY MASS INDEX: 24.62 KG/M2 | HEART RATE: 73 BPM | SYSTOLIC BLOOD PRESSURE: 140 MMHG

## 2024-02-15 DIAGNOSIS — Z98.890 STATUS POST IMPLANTATION OF MITRAL VALVE LEAFLET CLIP: Primary | Chronic | ICD-10-CM

## 2024-02-15 DIAGNOSIS — E78.2 MIXED HYPERLIPIDEMIA: Chronic | ICD-10-CM

## 2024-02-15 DIAGNOSIS — I49.3 FREQUENT PVCS: Chronic | ICD-10-CM

## 2024-02-15 DIAGNOSIS — Z95.818 STATUS POST IMPLANTATION OF MITRAL VALVE LEAFLET CLIP: Primary | Chronic | ICD-10-CM

## 2024-02-15 DIAGNOSIS — I10 BENIGN ESSENTIAL HYPERTENSION: Chronic | ICD-10-CM

## 2024-02-15 DIAGNOSIS — I42.9 CARDIOMYOPATHY, IDIOPATHIC (MULTI): Chronic | ICD-10-CM

## 2024-02-15 DIAGNOSIS — I34.0 NONRHEUMATIC MITRAL VALVE REGURGITATION: Chronic | ICD-10-CM

## 2024-02-15 PROCEDURE — 1125F AMNT PAIN NOTED PAIN PRSNT: CPT | Performed by: INTERNAL MEDICINE

## 2024-02-15 PROCEDURE — 1159F MED LIST DOCD IN RCRD: CPT | Performed by: INTERNAL MEDICINE

## 2024-02-15 PROCEDURE — 3077F SYST BP >= 140 MM HG: CPT | Performed by: INTERNAL MEDICINE

## 2024-02-15 PROCEDURE — 99214 OFFICE O/P EST MOD 30 MIN: CPT | Performed by: INTERNAL MEDICINE

## 2024-02-15 PROCEDURE — 1123F ACP DISCUSS/DSCN MKR DOCD: CPT | Performed by: INTERNAL MEDICINE

## 2024-02-15 PROCEDURE — 1157F ADVNC CARE PLAN IN RCRD: CPT | Performed by: INTERNAL MEDICINE

## 2024-02-15 PROCEDURE — 1036F TOBACCO NON-USER: CPT | Performed by: INTERNAL MEDICINE

## 2024-02-15 PROCEDURE — 3078F DIAST BP <80 MM HG: CPT | Performed by: INTERNAL MEDICINE

## 2024-02-15 PROCEDURE — 1160F RVW MEDS BY RX/DR IN RCRD: CPT | Performed by: INTERNAL MEDICINE

## 2024-02-15 NOTE — PATIENT INSTRUCTIONS
1. Mitral regurgitation. Severe. Prolapse of the P2 segment of the posterior leaflet. He had 2 mitral clips placed December 2022. At the end of the procedure he had trace to mild regurgitation. His echo in December revealed mitral regurgitation was moderate with an EF of 40%. When I had seen him in March I was concerned because the murmur was very prominent in the axilla. On a repeat echo his mitral regurgitation remained mild and anteriorly directed.  A repeat. Echo 12/2023 EF 50%, mild MR, mild functional MS gradient of 16mmHg.  Again, his murmur mitral regurgitation is disproportionate to what I see.  I am concerned about an eccentric jet that she is not being picked up by [facility].  Given his lack of symptoms we will follow clinically.  There is functional mitral stenosis     2. Nonischemic cardiomyopathy. Minimal plaquing on a cardiac catheterization from October 2022. EF 40%.  His most recent echo shows an ejection fraction of 50%.  Continue lisinopril metoprolol.  Unable to uptitrate the dose of metoprolol because of an underlying bradycardia.  Creatinine stable at 1.3.  He is totally off Lasix at this time    3. Frequent PVCs. Many are heard on examination today.     4. Hypertension.  Blood pressures at home are excellent    5. Acute kidney injury. His creatinine is stable at 1.3.  He is completely off of Lasix.  BUN 29 creatinine 1.34    Josesito doing great.  My plan is to see him back in 6 months.  I will see him sooner if any issues arise.

## 2024-03-05 ENCOUNTER — APPOINTMENT (OUTPATIENT)
Dept: CARDIOLOGY | Facility: CLINIC | Age: 84
End: 2024-03-05
Payer: MEDICARE

## 2024-03-06 NOTE — PROGRESS NOTES
HEARING AID CHECK    RIGHT: Phonak Audeo P50-R SN: [id number]  : 1 x M  Wax Guard/Dome: Cerushield; Large closed  LEFT: Phonak Audeo P50-R SN: [id number]  : 1 x M  Wax Guard/Dome: Cerushield; Large closed     Repair Warranty: 3/7/2024  L&D Warranty: 3/7/2024    Josesito Tamayo was seen for a hearing aid check.  Listening check revealed good working function of devices.  Patient reproted that since his hearing aids had been received from repair they had seemed out of balance. Increased right gain which helped slightly.  Additionally lowered compression ratio on the right side and increased output at 750 Hz.  Patient reported much more balanced sound quality. Patient satisfied.    N/c under warranty    RECOMMENDATIONS:  -Recommend patient return at next scheduled appointment or sooner should concerns arise.    Sanjay Arreola, CCC-A    Appt time: 8:30 - 9:00 AM

## 2024-03-07 ENCOUNTER — CLINICAL SUPPORT (OUTPATIENT)
Dept: AUDIOLOGY | Facility: CLINIC | Age: 84
End: 2024-03-07

## 2024-03-07 DIAGNOSIS — H90.3 SENSORINEURAL HEARING LOSS (SNHL) OF BOTH EARS: Primary | ICD-10-CM

## 2024-03-07 PROCEDURE — HRANC PR HEARING AID NO CHARGE: Performed by: AUDIOLOGIST

## 2024-03-14 ENCOUNTER — APPOINTMENT (OUTPATIENT)
Dept: CARDIOLOGY | Facility: CLINIC | Age: 84
End: 2024-03-14
Payer: MEDICARE

## 2024-03-15 ENCOUNTER — APPOINTMENT (OUTPATIENT)
Dept: CARDIOLOGY | Facility: CLINIC | Age: 84
End: 2024-03-15
Payer: MEDICARE

## 2024-03-28 DIAGNOSIS — I10 BENIGN ESSENTIAL HYPERTENSION: Chronic | ICD-10-CM

## 2024-03-28 RX ORDER — LISINOPRIL 5 MG/1
5 TABLET ORAL DAILY
Qty: 90 TABLET | Refills: 3 | Status: SHIPPED | OUTPATIENT
Start: 2024-03-28

## 2024-03-28 NOTE — TELEPHONE ENCOUNTER
PT stopped at [place] office, needs a refill Lisinopril 5mg  , he wants 90 day supply , [facility] in [place], Pharmacy, Monday morning he will be out of pills

## 2024-04-04 ENCOUNTER — APPOINTMENT (OUTPATIENT)
Dept: CARDIOLOGY | Facility: CLINIC | Age: 84
End: 2024-04-04
Payer: MEDICARE

## 2024-04-12 DIAGNOSIS — I49.3 FREQUENT PVCS: Chronic | ICD-10-CM

## 2024-04-12 RX ORDER — METOPROLOL SUCCINATE 25 MG/1
25 TABLET, EXTENDED RELEASE ORAL DAILY
Qty: 90 TABLET | Refills: 3 | Status: SHIPPED | OUTPATIENT
Start: 2024-04-12

## 2024-04-14 NOTE — PROGRESS NOTES
Subjective    Patient ID: Josesito Tamayo is a 83 y.o. male.    Chief Complaint: Left shoulder pain    HPI  Mr. Tamayo is a 82-year-old male who is well known to us for evaluation of his right shoulder pain. Reporting today due to a new onset of left shoulder pain. He explains that he also had his shingles and Covid boosters injected into his left shoulder which exacerbated his symptoms. He has done well with injections in his right arm and is hoping for one in his left arm today.    His right shoulder is doing well and is a 4/10 at present. There has been no recent injury. He continues to enjoy playing golf. He wants to continue injections and avoid surgery if possible. He doesn't believe he needs a repeat injection in his right arm today. He notes that his shoulder is about 80% normal currently. He has had an ultrasound guided injection into his scapula in the past which helped his pain. He hopes for a repeat injection today.    08/01/23  He presents today not feeling well. He explains his left shoulder injection worked fairly well for a while. The last 8-9 weeks have been rough since it wore off and his appointment was cancelled.     10/10/23  Josesito returns to the clinic today for a repeat followup visit regarding his left shoulder.     He explains today that injections have been helping again. He is hoping to continue with these today.     01/09/24  Josesito returns to the clinic today for a repeat follow up regarding his left shoulder.     He reports his left shoulder improved immensely after his previous injection. He explains that on Sunday night he took two Tylenol and then woke up yesterday morning and was able to reach above his head. He does still have some discomfort around his bicep.     04/16/24  Josesito returns to the clinic today for a repeat follow up visit regarding his left shoulder.     He reports his shoulder is doing well. Injections have continued to help. He notes that he occasionally gets sharp pains  in his bicep and will notice locking when drinking water, but this resolves on its own.     Past medical history, surgical history, social history, family history were all reviewed and are as per the [place] patient health history questionnaire form that I signed and scanned into the chart today.    Objective   Patient is a well-developed, well-nourished male in no acute distress.  Breathes with normal chest rises.  Pupils are round and symmetric today.  Awake, alert, and oriented x3.      Examination of the left shoulder today reveals the skin to be intact. There is no sign of any atrophy, lesions, or abrasions. There is no pain to palpation of the bony prominences. Cervical lymphadenopathy examined, and this was negative. Patient had 5 out of 5 wrist flexion, extension, and thumb extension bilaterally. Sensation was intact to light touch to median, ulnar, radial axillary, and musculocutaneous nerves bilaterally. Positive radial pulse bilaterally. Provocative maneuvers on the left side today were negative.  Range of motion of the left shoulder revealed 0-170° of forward elevation, 0-60° of external rotation, and internal rotation was to T-12.     Examination of the right shoulder today reveals the skin to be intact. There is no sign of any atrophy, lesions, or abrasions. There is no pain to palpation of the bony prominences. Cervical lymphadenopathy examined, and this was negative. Patient had 5 out of 5 wrist flexion, extension, and thumb extension, bilaterally. Sensation was intact to light touch to median, ulnar, radial, axillary, and musculocutaneous nerves bilaterally. Positive radial pulse bilaterally. Provocative maneuvers on the right side today were negative.  Range of motion of the right shoulder revealed 0-170° of forward elevation, 0-60° of external rotation, and internal rotation up to T-12.     Image Results:  CT also shows a type A glenoid    Patient ID: Josesito Tamayo is a 83 y.o. male.    L Inj/Asp: L  glenohumeral on 4/16/2024 11:01 AM  Indications: pain  Details: 20 G needle, anterior approach  Medications: 40 mg triamcinolone acetonide 40 mg/mL; 4 mL lidocaine 10 mg/mL (1 %)  Consent was given by the patient. Immediately prior to procedure a time out was called to verify the correct patient, procedure, equipment, support staff and site/side marked as required. Patient was prepped and draped in the usual sterile fashion.           Assessment/Plan   Encounter Diagnoses:  No diagnosis found.  Patient with largely resolved right shoulder pain with underlying chronic issue that flares up, as well as endstage arthritis of the left shoulder    At this time we had a long discussion about the various options for shoulder arthritis.  1. Do nothing. Continue activity modifications.  2. Consider cortisone injections into the glenohumeral joint. This would give pain relief that is temporary. This would not stop the progression of arthritis. For some patients, this injection can last not at all, for 2 weeks, 4 weeks, 3 months or longer. The risk of the injection is fairly minimal but does include a very small chance of infection.  3. A total shoulder replacement is the third option. This will give the patient a more permanent solution to pain relief. It would also improve function. There is no rush to this procedure it is an elective procedure.    We did a repeat injection for him today. He tolerated this well. We will see him back in 3 and a half months for a repeat injection at that time. If he feels that he needs a repeat injection at 3 months he is to call us and let us know.     No orders of the defined types were placed in this encounter.    Follow up in 3 and a half months.     Scribe Attestation  By signing my name below, Britni RODRIGUEZ Scribe   attest that this documentation has been prepared under the direction and in the presence of Adelfo Madden MD.

## 2024-04-16 ENCOUNTER — APPOINTMENT (OUTPATIENT)
Dept: ORTHOPEDIC SURGERY | Facility: HOSPITAL | Age: 84
End: 2024-04-16
Payer: MEDICARE

## 2024-04-16 ENCOUNTER — OFFICE VISIT (OUTPATIENT)
Dept: ORTHOPEDIC SURGERY | Facility: HOSPITAL | Age: 84
End: 2024-04-16
Payer: MEDICARE

## 2024-04-16 VITALS — BODY MASS INDEX: 25.05 KG/M2 | WEIGHT: 175 LBS | HEIGHT: 70 IN

## 2024-04-16 DIAGNOSIS — M25.512 LEFT SHOULDER PAIN, UNSPECIFIED CHRONICITY: Primary | ICD-10-CM

## 2024-04-16 PROCEDURE — 1157F ADVNC CARE PLAN IN RCRD: CPT | Performed by: ORTHOPAEDIC SURGERY

## 2024-04-16 PROCEDURE — 1160F RVW MEDS BY RX/DR IN RCRD: CPT | Performed by: ORTHOPAEDIC SURGERY

## 2024-04-16 PROCEDURE — 2500000005 HC RX 250 GENERAL PHARMACY W/O HCPCS: Performed by: ORTHOPAEDIC SURGERY

## 2024-04-16 PROCEDURE — 1159F MED LIST DOCD IN RCRD: CPT | Performed by: ORTHOPAEDIC SURGERY

## 2024-04-16 PROCEDURE — 20610 DRAIN/INJ JOINT/BURSA W/O US: CPT | Performed by: ORTHOPAEDIC SURGERY

## 2024-04-16 PROCEDURE — 1123F ACP DISCUSS/DSCN MKR DOCD: CPT | Performed by: ORTHOPAEDIC SURGERY

## 2024-04-16 PROCEDURE — 99213 OFFICE O/P EST LOW 20 MIN: CPT | Performed by: ORTHOPAEDIC SURGERY

## 2024-04-16 PROCEDURE — 2500000004 HC RX 250 GENERAL PHARMACY W/ HCPCS (ALT 636 FOR OP/ED): Performed by: ORTHOPAEDIC SURGERY

## 2024-04-16 RX ADMIN — TRIAMCINOLONE ACETONIDE 40 MG: 400 INJECTION, SUSPENSION INTRA-ARTICULAR; INTRAMUSCULAR at 11:01

## 2024-04-16 RX ADMIN — LIDOCAINE HYDROCHLORIDE 4 ML: 10 INJECTION, SOLUTION INFILTRATION; PERINEURAL at 11:01

## 2024-04-16 ASSESSMENT — PAIN - FUNCTIONAL ASSESSMENT: PAIN_FUNCTIONAL_ASSESSMENT: 0-10

## 2024-04-16 ASSESSMENT — PAIN DESCRIPTION - DESCRIPTORS: DESCRIPTORS: SHARP

## 2024-04-16 ASSESSMENT — PAIN SCALES - GENERAL: PAINLEVEL_OUTOF10: 5 - MODERATE PAIN

## 2024-04-22 RX ORDER — LIDOCAINE HYDROCHLORIDE 10 MG/ML
4 INJECTION INFILTRATION; PERINEURAL
Status: COMPLETED | OUTPATIENT
Start: 2024-04-16 | End: 2024-04-16

## 2024-04-22 RX ORDER — TRIAMCINOLONE ACETONIDE 40 MG/ML
40 INJECTION, SUSPENSION INTRA-ARTICULAR; INTRAMUSCULAR
Status: COMPLETED | OUTPATIENT
Start: 2024-04-16 | End: 2024-04-16

## 2024-07-09 PROBLEM — E55.9 VITAMIN D DEFICIENCY: Status: ACTIVE | Noted: 2024-01-30

## 2024-07-22 NOTE — PROGRESS NOTES
Subjective    Patient ID: Josesito Tamayo is a 84 y.o. male.    Chief Complaint: Left shoulder pain    HPI  Mr. Tamayo is a 82-year-old male who is well known to us for evaluation of his right shoulder pain. Reporting today due to a new onset of left shoulder pain. He explains that he also had his shingles and Covid boosters injected into his left shoulder which exacerbated his symptoms. He has done well with injections in his right arm and is hoping for one in his left arm today.    His right shoulder is doing well and is a 4/10 at present. There has been no recent injury. He continues to enjoy playing golf. He wants to continue injections and avoid surgery if possible. He doesn't believe he needs a repeat injection in his right arm today. He notes that his shoulder is about 80% normal currently. He has had an ultrasound guided injection into his scapula in the past which helped his pain. He hopes for a repeat injection today.    08/01/23  He presents today not feeling well. He explains his left shoulder injection worked fairly well for a while. The last 8-9 weeks have been rough since it wore off and his appointment was cancelled.     10/10/23  Josesito returns to the clinic today for a repeat followup visit regarding his left shoulder.     He explains today that injections have been helping again. He is hoping to continue with these today.     01/09/24  Josesito returns to the clinic today for a repeat follow up regarding his left shoulder.     He reports his left shoulder improved immensely after his previous injection. He explains that on Sunday night he took two Tylenol and then woke up yesterday morning and was able to reach above his head. He does still have some discomfort around his bicep.     04/16/24  Josesito returns to the clinic today for a repeat follow up visit regarding his left shoulder.     He reports his shoulder is doing well. Injections have continued to help. He notes that he occasionally gets sharp pains  in his bicep and will notice locking when drinking water, but this resolves on its own.     7/23/24   Josesito Tamayo is a 84 y.o. male returning to the clinic for a repeat follow up visit regarding his left shoulder.    He is here today for a repeat injection. He continues to get enough sustained relief to continue them.     Past medical history, surgical history, social history, family history were all reviewed and are as per the [facility] patient health history questionnaire form that I signed and scanned into the chart today.    Objective   Patient is a well-developed, well-nourished male in no acute distress.  Breathes with normal chest rises.  Pupils are round and symmetric today.  Awake, alert, and oriented x3.      Examination of the left shoulder today reveals the skin to be intact. There is no sign of any atrophy, lesions, or abrasions. There is no pain to palpation of the bony prominences. Cervical lymphadenopathy examined, and this was negative. Patient had 5 out of 5 wrist flexion, extension, and thumb extension bilaterally. Sensation was intact to light touch to median, ulnar, radial axillary, and musculocutaneous nerves bilaterally. Positive radial pulse bilaterally. Provocative maneuvers on the left side today were negative.  Range of motion of the left shoulder revealed 0-170° of forward elevation, 0-60° of external rotation, and internal rotation was to T-12.     Examination of the right shoulder today reveals the skin to be intact. There is no sign of any atrophy, lesions, or abrasions. There is no pain to palpation of the bony prominences. Cervical lymphadenopathy examined, and this was negative. Patient had 5 out of 5 wrist flexion, extension, and thumb extension, bilaterally. Sensation was intact to light touch to median, ulnar, radial, axillary, and musculocutaneous nerves bilaterally. Positive radial pulse bilaterally. Provocative maneuvers on the right side today were negative.  Range of motion of  the right shoulder revealed 0-170° of forward elevation, 0-60° of external rotation, and internal rotation up to T-12.     Image Results:  CT also shows a type A glenoid    Patient ID: Josesito Tamayo is a 84 y.o. male.    L Inj/Asp: L glenohumeral on 7/23/2024 10:48 AM  Indications: pain  Details: 20 G needle, anterior approach  Medications: 40 mg triamcinolone acetonide 40 mg/mL; 4 mL lidocaine 10 mg/mL (1 %)  Consent was given by the patient. Immediately prior to procedure a time out was called to verify the correct patient, procedure, equipment, support staff and site/side marked as required. Patient was prepped and draped in the usual sterile fashion.           Assessment/Plan   Encounter Diagnoses:  No diagnosis found.  Patient with largely resolved right shoulder pain with underlying chronic issue that flares up, as well as endstage arthritis of the left shoulder    At this time we had a long discussion about the various options for shoulder arthritis.  1. Do nothing. Continue activity modifications.  2. Consider cortisone injections into the glenohumeral joint. This would give pain relief that is temporary. This would not stop the progression of arthritis. For some patients, this injection can last not at all, for 2 weeks, 4 weeks, 3 months or longer. The risk of the injection is fairly minimal but does include a very small chance of infection.  3. A total shoulder replacement is the third option. This will give the patient a more permanent solution to pain relief. It would also improve function. There is no rush to this procedure it is an elective procedure.    We did a repeat injection for him today. He tolerated this well. We will see him back in 3 and a half months for a repeat injection at that time. If he feels that he needs a repeat injection at 3 months he is to call us and let us know.     No orders of the defined types were placed in this encounter.    Follow up in 3 and a half months.     Scribe  Attestation  By signing my name below, I, Camilla Subramanianibdesean   attest that this documentation has been prepared under the direction and in the presence of Adelfo Madden MD.

## 2024-07-23 ENCOUNTER — OFFICE VISIT (OUTPATIENT)
Dept: ORTHOPEDIC SURGERY | Facility: HOSPITAL | Age: 84
End: 2024-07-23
Payer: MEDICARE

## 2024-07-23 DIAGNOSIS — M25.512 LEFT SHOULDER PAIN, UNSPECIFIED CHRONICITY: Primary | ICD-10-CM

## 2024-07-23 PROCEDURE — 2500000005 HC RX 250 GENERAL PHARMACY W/O HCPCS: Performed by: ORTHOPAEDIC SURGERY

## 2024-07-23 PROCEDURE — 99213 OFFICE O/P EST LOW 20 MIN: CPT | Performed by: ORTHOPAEDIC SURGERY

## 2024-07-23 PROCEDURE — 1157F ADVNC CARE PLAN IN RCRD: CPT | Performed by: ORTHOPAEDIC SURGERY

## 2024-07-23 PROCEDURE — 1123F ACP DISCUSS/DSCN MKR DOCD: CPT | Performed by: ORTHOPAEDIC SURGERY

## 2024-07-23 PROCEDURE — 2500000004 HC RX 250 GENERAL PHARMACY W/ HCPCS (ALT 636 FOR OP/ED): Performed by: ORTHOPAEDIC SURGERY

## 2024-07-23 PROCEDURE — 99213 OFFICE O/P EST LOW 20 MIN: CPT | Mod: 25 | Performed by: ORTHOPAEDIC SURGERY

## 2024-07-23 PROCEDURE — 20610 DRAIN/INJ JOINT/BURSA W/O US: CPT | Mod: LT | Performed by: ORTHOPAEDIC SURGERY

## 2024-07-25 ENCOUNTER — APPOINTMENT (OUTPATIENT)
Dept: CARDIOLOGY | Facility: CLINIC | Age: 84
End: 2024-07-25
Payer: MEDICARE

## 2024-07-25 VITALS
HEART RATE: 81 BPM | BODY MASS INDEX: 25.05 KG/M2 | WEIGHT: 175 LBS | OXYGEN SATURATION: 98 % | HEIGHT: 70 IN | DIASTOLIC BLOOD PRESSURE: 70 MMHG | SYSTOLIC BLOOD PRESSURE: 151 MMHG

## 2024-07-25 DIAGNOSIS — I10 BENIGN ESSENTIAL HYPERTENSION: Chronic | ICD-10-CM

## 2024-07-25 DIAGNOSIS — I42.9 CARDIOMYOPATHY, IDIOPATHIC (MULTI): Chronic | ICD-10-CM

## 2024-07-25 DIAGNOSIS — Z95.818 STATUS POST IMPLANTATION OF MITRAL VALVE LEAFLET CLIP: Chronic | ICD-10-CM

## 2024-07-25 DIAGNOSIS — E78.2 MIXED HYPERLIPIDEMIA: Chronic | ICD-10-CM

## 2024-07-25 DIAGNOSIS — I34.0 NONRHEUMATIC MITRAL VALVE REGURGITATION: Chronic | ICD-10-CM

## 2024-07-25 DIAGNOSIS — I71.21 ANEURYSM OF ASCENDING AORTA WITHOUT RUPTURE (CMS-HCC): Primary | Chronic | ICD-10-CM

## 2024-07-25 DIAGNOSIS — Z98.890 STATUS POST IMPLANTATION OF MITRAL VALVE LEAFLET CLIP: Chronic | ICD-10-CM

## 2024-07-25 PROBLEM — N18.31 STAGE 3A CHRONIC KIDNEY DISEASE (MULTI): Chronic | Status: ACTIVE | Noted: 2023-04-19

## 2024-07-25 PROBLEM — R01.1 HEART MURMUR: Status: RESOLVED | Noted: 2023-04-19 | Resolved: 2024-07-25

## 2024-07-25 PROCEDURE — 1160F RVW MEDS BY RX/DR IN RCRD: CPT | Performed by: INTERNAL MEDICINE

## 2024-07-25 PROCEDURE — 3077F SYST BP >= 140 MM HG: CPT | Performed by: INTERNAL MEDICINE

## 2024-07-25 PROCEDURE — 99214 OFFICE O/P EST MOD 30 MIN: CPT | Performed by: INTERNAL MEDICINE

## 2024-07-25 PROCEDURE — 1126F AMNT PAIN NOTED NONE PRSNT: CPT | Performed by: INTERNAL MEDICINE

## 2024-07-25 PROCEDURE — 1123F ACP DISCUSS/DSCN MKR DOCD: CPT | Performed by: INTERNAL MEDICINE

## 2024-07-25 PROCEDURE — 1159F MED LIST DOCD IN RCRD: CPT | Performed by: INTERNAL MEDICINE

## 2024-07-25 PROCEDURE — 1036F TOBACCO NON-USER: CPT | Performed by: INTERNAL MEDICINE

## 2024-07-25 PROCEDURE — 1157F ADVNC CARE PLAN IN RCRD: CPT | Performed by: INTERNAL MEDICINE

## 2024-07-25 PROCEDURE — 3078F DIAST BP <80 MM HG: CPT | Performed by: INTERNAL MEDICINE

## 2024-07-25 RX ORDER — TRIAMCINOLONE ACETONIDE 40 MG/ML
40 INJECTION, SUSPENSION INTRA-ARTICULAR; INTRAMUSCULAR
Status: COMPLETED | OUTPATIENT
Start: 2024-07-23 | End: 2024-07-23

## 2024-07-25 RX ORDER — LIDOCAINE HYDROCHLORIDE 10 MG/ML
4 INJECTION INFILTRATION; PERINEURAL
Status: COMPLETED | OUTPATIENT
Start: 2024-07-23 | End: 2024-07-23

## 2024-07-25 ASSESSMENT — ENCOUNTER SYMPTOMS
DEPRESSION: 0
LOSS OF SENSATION IN FEET: 0
OCCASIONAL FEELINGS OF UNSTEADINESS: 0

## 2024-07-25 ASSESSMENT — PAIN SCALES - GENERAL: PAINLEVEL: 0-NO PAIN

## 2024-07-25 NOTE — PROGRESS NOTES
Referred by No ref. provider found    HPI states here for 6-month follow-up.  Continues to feel terrific.  Past Medical History:  Problem List Items Addressed This Visit    None     Past Medical History:   Diagnosis Date    Aortic aneurysm, thoracic (CMS-HCC) 04/19/2023    4 cm per echo of 7/2020   4.4 cm per echo of 3/2023    Benign essential hypertension 04/19/2023    Bursitis of unspecified shoulder 05/19/2020    Scapulothoracic bursitis    Candidiasis of skin and nail 05/22/2019    Candidal dermatitis    Cardiomyopathy, idiopathic (Multi) 02/14/2024    Chronic rhinitis 10/09/2017    Rhinitis    Disorder of male genital organs, unspecified 07/11/2016    Disorder of scrotum    Dysuria 04/19/2023    Encounter for immunization 06/15/2021    Encounter for immunization    Encounter for immunization     Immunization due    Encounter for other preprocedural examination 11/16/2022    Preoperative clearance    Encounter for screening for depression 03/02/2022    Screening for depression    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 Kay follows    Laceration without foreign body of unspecified finger without damage to nail, initial encounter 06/15/2021    Finger laceration    Malignant neoplasm of prostate (Multi) 01/20/2024    Nonrheumatic mitral valve regurgitation 04/19/2023    P2 PMVL prolapse with severe ant-directed MR … EF dropped to 40-45% in 10/2022 on KATE   2 MitraClip 12/1/2022    Other conditions influencing health status 10/09/2017    History of cough    Other difficulties with micturition 07/11/2016    Abnormal urination    Other difficulties with micturition 07/11/2016    Abnormal urinary stream    Other shoulder lesions, unspecified shoulder 04/22/2015    Rotator cuff tendinitis    Other specified disorders of ear, unspecified ear 11/27/2017    Ear fullness    Other specified disorders of eustachian tube, right ear 11/10/2017    Dysfunction of right eustachian tube    Other specified  personal risk factors, not elsewhere classified 11/25/2019    Pneumococcal vaccination indicated    Other specified personal risk factors, not elsewhere classified 11/21/2018    Pneumococcal vaccination indicated    Other symptoms and signs involving the genitourinary system 07/11/2016    Abnormal prostate exam    Pain in unspecified knee 01/05/2015    Knee pain    Personal history of malignant neoplasm of prostate 12/28/2020    History of prostate cancer    Personal history of other diseases of the circulatory system     History of hypertension    Personal history of other diseases of the circulatory system     Personal history of cardiac murmur    Personal history of other diseases of the nervous system and sense organs 11/27/2017    History of eustachian tube dysfunction    Personal history of other endocrine, nutritional and metabolic disease     History of high cholesterol    Personal history of other infectious and parasitic diseases     History of mumps    Personal history of other specified conditions 02/20/2018    History of urinary frequency    Personal history of other specified conditions     History of urinary frequency    Personal history of other specified conditions 07/21/2022    History of dysuria    Personal history of other specified conditions 04/01/2015    History of gross hematuria    Personal history of urinary (tract) infections 08/10/2018    History of recurrent urinary tract infection    Personal history of urinary (tract) infections 07/11/2016    History of urinary tract infection    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      Social History:  He reports that he has never smoked. He has never used smokeless tobacco. He reports that he does not currently use alcohol. He reports that he does not use drugs.    Family History:  Family History   Problem Relation Name Age of Onset    Hyperlipidemia Other      Prostate cancer Other      Hypertension Other       Allergies:  Losartan    Outpatient Medications:  Current Outpatient Medications   Medication Instructions    aspirin 81 mg EC tablet 1 tablet, oral, Daily    cholecalciferol (Vitamin D-3) 50 mcg (2,000 unit) capsule 1 capsule, oral, Daily    lisinopril 5 mg, oral, Daily    metoprolol succinate XL (TOPROL-XL) 25 mg, oral, Daily    pravastatin (PRAVACHOL) 40 mg, oral, Daily     Last Recorded Vitals:  There were no vitals filed for this visit.    Physical Exam  Patient is alert and oriented x3.  HEENT is unremarkable mucous members are moist  Neck no JVP no bruits upstrokes are full no thyromegaly  Lungs are clear bilaterally.  No wheezing crackles or rales  Heart regular rhythm with frequent ectopy normal S1-S2 there is no S3 2/6 to 3/6 holosystolic murmur throughout the precordium loudest in the left axilla abdomen is soft bs are positive nontender nondistended no organomegaly no pulsatile masses  Extremities have no edema.  Distal pulses present palpable.  Neuro is grossly nonfocal  Skin has no rashes     Last Labs:  CBC -  Lab Results   Component Value Date    WBC 6.9 01/30/2024    HGB 14.3 01/30/2024    HCT 45.2 01/30/2024    MCV 86 01/30/2024     01/30/2024     CMP -  Lab Results   Component Value Date    CALCIUM 10.2 01/30/2024    PHOS 4.7 12/02/2022    PROT 7.1 01/30/2024    ALBUMIN 4.5 01/30/2024    AST 16 01/30/2024    ALT 14 01/30/2024    ALKPHOS 51 01/30/2024    BILITOT 1.8 (H) 01/30/2024     LIPID PANEL -   Lab Results   Component Value Date    CHOL 201  "(H) 01/30/2024    HDL 56.8 01/30/2024    CHHDL 3.5 01/30/2024    VLDL 19 01/30/2024    TRIG 95 01/30/2024    NHDL 144 01/30/2024     RENAL FUNCTION PANEL -   Lab Results   Component Value Date    K 5.2 01/30/2024    PHOS 4.7 12/02/2022     No results found for: \"BNP\", \"HGBA1C\"       Procedure    Echo 12/22/23 EF 50%, Mod PIOTR, MVT, 2 mitraclips with mild MR and  transmitral gradient of 16mmHg    ECHO [03/07/2023]: Est EF 40%. SD = impaired relaxation pattern. LA mild-mod dial. Two mitral clips present. Mild anteriorly-directed MR present. Tricuspid valve is abnormal. Tricuspid valve leaflets thickened and redundant. AV sclerosis. TA mildly dial @ 4.4 cm.     ECHO [12/28/2022]: Est EF 40%. SD = impaired relaxation pattern. LA mod-severely dial. Mitral clip present. Residual moderate centrally-directed MR.     ECHO [12/02/2022]: EF 45-50%. SD = abnormal pattern. Elev mean LA pressure. Mildly reduced RVSF. LA severely dial. MV clips present w/trace-mild MR and no stenosis. RVSP mildly elev (34.8 mmHg). Mild AR. LV Global Longitudinal Strain -9.7%. Strain values abnormal, c/w impaired LV function. Global hypok. Of LV w/minor regional variations. Mod dial ascending aorta (4.5 cm).     MitraClip [12/01/2022, Dr. Caren Dominguez]: Successful percutanous edge to edge mitral valve repair with 2 MitraClip devices.      Left & Right Heart Cath [10/27/2022, Dr. Neftali Sosa]: Angiographically normal left main. 30% mid LAD stenosis after a large diagonal branch with luminal irregularities in the rest of the artery. Luminal irregularities throughout left circumflex system. Large dominant RCA with luminal irregularities throughout the vessel. Right dominant coronary artery system. Normal cardiac output. Elevated filling pressures. Conclusions: nonobstructive CAD     AA U/S (12/05/2018): No aneurysm.     Assessment/Plan   1. Mitral regurgitation. Severe. Prolapse of the P2 segment of the posterior leaflet. He had 2 mitral clips " placed December 2022. At the end of the procedure he had trace to mild regurgitation. His echo in December revealed mitral regurgitation was moderate with an EF of 40%. When I had seen him in March I was concerned because the murmur was very prominent in the axilla. On a repeat echo his mitral regurgitation remained mild and anteriorly directed.  A repeat. Echo 12/2023 EF 50%, mild MR, mild functional MS gradient of 16mmHg.  Today again, the mitral regurgitation murmur is more substantial than what would be anticipated based upon the mild mitral regurgitation seen on his echo.  I do suspect this is an eccentric jet and were just missing it.  Follow-up echo will be done in December.  We will also quantify his ejection fraction once again.  He feels well and therefore no intervention is required at this time     2. Nonischemic cardiomyopathy. Minimal plaquing on a cardiac catheterization from October 2022. EF 40%.  His most recent echo shows an ejection fraction of 50%.  Continue lisinopril metoprolol.  Unable to uptitrate the dose of metoprolol because of an underlying bradycardia.  Creatinine stable at 1.3.  He is totally off Lasix at this time.  Reassess LV function on his echo December 2024    3. Frequent PVCs. Many are heard on examination today.     4. Hypertension.  Blood pressures at home are excellent    5. Acute kidney injury. His creatinine is stable at 1.3.  He is completely off of Lasix.  1/30/2024 BUN 29 creatinine 1.34    6.  Thoracic aortic aneurysm.  4.4 cm on his echo 3/7/2023.    Echo November December 2024.  Return to see me 6 months  Lyndsay Jackson MD     Instructions and follow up

## 2024-07-25 NOTE — PATIENT INSTRUCTIONS
1. Mitral regurgitation. Severe. Prolapse of the P2 segment of the posterior leaflet. He had 2 mitral clips placed December 2022. At the end of the procedure he had trace to mild regurgitation. His echo in December revealed mitral regurgitation was moderate with an EF of 40%. When I had seen him in March I was concerned because the murmur was very prominent in the axilla. On a repeat echo his mitral regurgitation remained mild and anteriorly directed.  A repeat. Echo 12/2023 EF 50%, mild MR, mild functional MS gradient of 16mmHg.  Today again, the mitral regurgitation murmur is more substantial than what would be anticipated based upon the mild mitral regurgitation seen on his echo.  I do suspect this is an eccentric jet and were just missing it.  Follow-up echo will be done in December.  We will also quantify his ejection fraction once again.  He feels well and therefore no intervention is required at this time     2. Nonischemic cardiomyopathy. Minimal plaquing on a cardiac catheterization from October 2022. EF 40%.  His most recent echo shows an ejection fraction of 50%.  Continue lisinopril metoprolol.  Unable to uptitrate the dose of metoprolol because of an underlying bradycardia.  Creatinine stable at 1.3.  He is totally off Lasix at this time.  Reassess LV function on his echo December 2024    3. Frequent PVCs. Many are heard on examination today.     4. Hypertension.  Blood pressures at home are excellent    5. Acute kidney injury. His creatinine is stable at 1.3.  He is completely off of Lasix.  1/30/2024 BUN 29 creatinine 1.34    6.  Thoracic aortic aneurysm.  4.4 cm on his echo 3/7/2023.    Echo November December 2024.  Return to see me 6 months

## 2024-07-26 ENCOUNTER — APPOINTMENT (OUTPATIENT)
Dept: CARDIOLOGY | Facility: CLINIC | Age: 84
End: 2024-07-26
Payer: MEDICARE

## 2024-07-29 ENCOUNTER — APPOINTMENT (OUTPATIENT)
Dept: AUDIOLOGY | Facility: CLINIC | Age: 84
End: 2024-07-29
Payer: MEDICARE

## 2024-07-30 ENCOUNTER — APPOINTMENT (OUTPATIENT)
Dept: PRIMARY CARE | Facility: CLINIC | Age: 84
End: 2024-07-30
Payer: MEDICARE

## 2024-07-30 ENCOUNTER — LAB (OUTPATIENT)
Dept: LAB | Facility: LAB | Age: 84
End: 2024-07-30
Payer: MEDICARE

## 2024-07-30 VITALS
OXYGEN SATURATION: 96 % | WEIGHT: 172.7 LBS | DIASTOLIC BLOOD PRESSURE: 85 MMHG | TEMPERATURE: 98.2 F | HEART RATE: 79 BPM | SYSTOLIC BLOOD PRESSURE: 135 MMHG | BODY MASS INDEX: 24.78 KG/M2

## 2024-07-30 DIAGNOSIS — D63.1 ANEMIA DUE TO STAGE 3A CHRONIC KIDNEY DISEASE (MULTI): ICD-10-CM

## 2024-07-30 DIAGNOSIS — Z98.890 STATUS POST IMPLANTATION OF MITRAL VALVE LEAFLET CLIP: Chronic | ICD-10-CM

## 2024-07-30 DIAGNOSIS — M79.10 MYALGIA DUE TO HMG COA REDUCTASE INHIBITOR: ICD-10-CM

## 2024-07-30 DIAGNOSIS — Z85.46 HISTORY OF PROSTATE CANCER: ICD-10-CM

## 2024-07-30 DIAGNOSIS — N18.31 ANEMIA DUE TO STAGE 3A CHRONIC KIDNEY DISEASE (MULTI): ICD-10-CM

## 2024-07-30 DIAGNOSIS — E78.2 MIXED HYPERLIPIDEMIA: ICD-10-CM

## 2024-07-30 DIAGNOSIS — E55.9 VITAMIN D DEFICIENCY: ICD-10-CM

## 2024-07-30 DIAGNOSIS — T46.6X5A MYALGIA DUE TO HMG COA REDUCTASE INHIBITOR: ICD-10-CM

## 2024-07-30 DIAGNOSIS — I34.0 NONRHEUMATIC MITRAL VALVE REGURGITATION: Chronic | ICD-10-CM

## 2024-07-30 DIAGNOSIS — Z95.818 STATUS POST IMPLANTATION OF MITRAL VALVE LEAFLET CLIP: Chronic | ICD-10-CM

## 2024-07-30 DIAGNOSIS — I50.20 HFREF (HEART FAILURE WITH REDUCED EJECTION FRACTION) (MULTI): ICD-10-CM

## 2024-07-30 DIAGNOSIS — N18.31 STAGE 3A CHRONIC KIDNEY DISEASE (MULTI): Chronic | ICD-10-CM

## 2024-07-30 DIAGNOSIS — I71.21 ANEURYSM OF ASCENDING AORTA WITHOUT RUPTURE (CMS-HCC): Chronic | ICD-10-CM

## 2024-07-30 DIAGNOSIS — I10 BENIGN ESSENTIAL HYPERTENSION: Chronic | ICD-10-CM

## 2024-07-30 PROBLEM — Z86.39 HISTORY OF METABOLIC DISORDER: Status: RESOLVED | Noted: 2024-01-20 | Resolved: 2024-07-30

## 2024-07-30 PROBLEM — M19.019 DEGENERATIVE JOINT DISEASE OF SHOULDER REGION: Status: RESOLVED | Noted: 2022-09-24 | Resolved: 2024-07-30

## 2024-07-30 LAB
ALBUMIN SERPL BCP-MCNC: 4.5 G/DL (ref 3.4–5)
ALP SERPL-CCNC: 57 U/L (ref 33–136)
ALT SERPL W P-5'-P-CCNC: 33 U/L (ref 10–52)
ANION GAP SERPL CALC-SCNC: 13 MMOL/L (ref 10–20)
AST SERPL W P-5'-P-CCNC: 17 U/L (ref 9–39)
BASOPHILS # BLD AUTO: 0.02 X10*3/UL (ref 0–0.1)
BASOPHILS NFR BLD AUTO: 0.3 %
BILIRUB SERPL-MCNC: 2.3 MG/DL (ref 0–1.2)
BUN SERPL-MCNC: 34 MG/DL (ref 6–23)
CALCIUM SERPL-MCNC: 10 MG/DL (ref 8.6–10.6)
CHLORIDE SERPL-SCNC: 100 MMOL/L (ref 98–107)
CO2 SERPL-SCNC: 27 MMOL/L (ref 21–32)
CREAT SERPL-MCNC: 1.14 MG/DL (ref 0.5–1.3)
EGFRCR SERPLBLD CKD-EPI 2021: 64 ML/MIN/1.73M*2
EOSINOPHIL # BLD AUTO: 0.18 X10*3/UL (ref 0–0.4)
EOSINOPHIL NFR BLD AUTO: 2.3 %
ERYTHROCYTE [DISTWIDTH] IN BLOOD BY AUTOMATED COUNT: 14.8 % (ref 11.5–14.5)
GLUCOSE SERPL-MCNC: 94 MG/DL (ref 74–99)
HCT VFR BLD AUTO: 43.7 % (ref 41–52)
HGB BLD-MCNC: 14.2 G/DL (ref 13.5–17.5)
IMM GRANULOCYTES # BLD AUTO: 0.04 X10*3/UL (ref 0–0.5)
IMM GRANULOCYTES NFR BLD AUTO: 0.5 % (ref 0–0.9)
LYMPHOCYTES # BLD AUTO: 1.78 X10*3/UL (ref 0.8–3)
LYMPHOCYTES NFR BLD AUTO: 22.8 %
MCH RBC QN AUTO: 27.5 PG (ref 26–34)
MCHC RBC AUTO-ENTMCNC: 32.5 G/DL (ref 32–36)
MCV RBC AUTO: 85 FL (ref 80–100)
MONOCYTES # BLD AUTO: 0.78 X10*3/UL (ref 0.05–0.8)
MONOCYTES NFR BLD AUTO: 10 %
NEUTROPHILS # BLD AUTO: 5 X10*3/UL (ref 1.6–5.5)
NEUTROPHILS NFR BLD AUTO: 64.1 %
NRBC BLD-RTO: 0 /100 WBCS (ref 0–0)
PLATELET # BLD AUTO: 214 X10*3/UL (ref 150–450)
POTASSIUM SERPL-SCNC: 5.5 MMOL/L (ref 3.5–5.3)
PROT SERPL-MCNC: 7.1 G/DL (ref 6.4–8.2)
PSA SERPL-MCNC: <0.1 NG/ML
RBC # BLD AUTO: 5.16 X10*6/UL (ref 4.5–5.9)
SODIUM SERPL-SCNC: 134 MMOL/L (ref 136–145)
WBC # BLD AUTO: 7.8 X10*3/UL (ref 4.4–11.3)

## 2024-07-30 PROCEDURE — 3079F DIAST BP 80-89 MM HG: CPT | Performed by: FAMILY MEDICINE

## 2024-07-30 PROCEDURE — 1157F ADVNC CARE PLAN IN RCRD: CPT | Performed by: FAMILY MEDICINE

## 2024-07-30 PROCEDURE — 3075F SYST BP GE 130 - 139MM HG: CPT | Performed by: FAMILY MEDICINE

## 2024-07-30 PROCEDURE — 36415 COLL VENOUS BLD VENIPUNCTURE: CPT

## 2024-07-30 PROCEDURE — 99214 OFFICE O/P EST MOD 30 MIN: CPT | Performed by: FAMILY MEDICINE

## 2024-07-30 PROCEDURE — 1159F MED LIST DOCD IN RCRD: CPT | Performed by: FAMILY MEDICINE

## 2024-07-30 PROCEDURE — 1160F RVW MEDS BY RX/DR IN RCRD: CPT | Performed by: FAMILY MEDICINE

## 2024-07-30 PROCEDURE — 1123F ACP DISCUSS/DSCN MKR DOCD: CPT | Performed by: FAMILY MEDICINE

## 2024-07-30 ASSESSMENT — PATIENT HEALTH QUESTIONNAIRE - PHQ9
1. LITTLE INTEREST OR PLEASURE IN DOING THINGS: NOT AT ALL
SUM OF ALL RESPONSES TO PHQ9 QUESTIONS 1 AND 2: 0
2. FEELING DOWN, DEPRESSED OR HOPELESS: NOT AT ALL

## 2024-07-30 NOTE — ASSESSMENT & PLAN NOTE
Lab Results   Component Value Date    CHOL 201 (H) 01/30/2024    CHOL 189 01/09/2023    CHOL 231 (H) 01/03/2022     Lab Results   Component Value Date    HDL 56.8 01/30/2024    HDL 48.5 01/09/2023    HDL 57.7 01/03/2022     Lab Results   Component Value Date    LDLCALC 125 (H) 01/30/2024     Lab Results   Component Value Date    TRIG 95 01/30/2024    TRIG 98 01/09/2023    TRIG 90 01/03/2022   Stable.  Continue pravastatin 40 mg daily.

## 2024-07-30 NOTE — ASSESSMENT & PLAN NOTE
status post brachii therapy 2005  PSA have consistently been undetectable  Repeat PSA level ordered.

## 2024-07-30 NOTE — ASSESSMENT & PLAN NOTE
Per Dr Jackson monitoring - his visit note from 7/25/2024:  His echo in December revealed mitral regurgitation was moderate with an EF of 40%. When I had seen him in March I was concerned because the murmur was very prominent in the axilla. On a repeat echo his mitral regurgitation remained mild and anteriorly directed. A repeat. Echo 12/2023 EF 50%, mild MR, mild functional MS gradient of 16mmHg. Today again, the mitral regurgitation murmur is more substantial than what would be anticipated based upon the mild mitral regurgitation seen on his echo. I do suspect this is an eccentric jet and were just missing it. Follow-up echo will be done in December. We will also quantify his ejection fraction once again. He feels well and therefore no intervention is required at this time   He continues to have a significant murmur on exam. He feels terrific. A repeat echo will be done March 2024.

## 2024-07-30 NOTE — PROGRESS NOTES
Subjective   Patient ID: Josesito Tamayo is a 84 y.o. male who presents for Hypertension and Hyperlipidemia.    His most recent appointment with Dr. Jackson was on 7/25, no med changes were made. Dr. Jackson did report his murmur being a bit louder than heard previously, there is some concern that there is a new problem. A report echocardiogram has been ordered by Dr. Jackson and will be performed in December.  He recently received an injection in his left shoulder on 7/23 by Dr. Madden. He is doing well and will be seeing him again on 10/22.         Review of Systems   Musculoskeletal:         Bilateral shoulder pain       Objective   /85   Pulse 79   Temp 36.8 °C (98.2 °F)   Wt 78.3 kg (172 lb 11.2 oz)   SpO2 96%   BMI 24.78 kg/m²     Physical Exam  Constitutional:       Appearance: Normal appearance. He is normal weight.   HENT:      Head: Normocephalic.      Right Ear: Tympanic membrane normal.      Left Ear: Tympanic membrane normal.      Nose: Nose normal.      Mouth/Throat:      Pharynx: Oropharynx is clear.   Eyes:      Conjunctiva/sclera: Conjunctivae normal.   Cardiovascular:      Rate and Rhythm: Normal rate and regular rhythm.      Pulses: Normal pulses.      Heart sounds: Murmur heard.      Comments: Murmur heard R side level 1, L side level 2-3  Pulmonary:      Effort: Pulmonary effort is normal.      Breath sounds: Normal breath sounds.   Abdominal:      General: Abdomen is flat. Bowel sounds are normal.      Palpations: Abdomen is soft.   Musculoskeletal:      Cervical back: Normal range of motion.   Neurological:      General: No focal deficit present.      Mental Status: He is alert.   Psychiatric:         Mood and Affect: Mood normal.         Behavior: Behavior normal.         Thought Content: Thought content normal.         Judgment: Judgment normal.         Assessment/Plan   Problem List Items Addressed This Visit       Stage 3a chronic kidney disease (Multi) (Chronic)     GFR stable @ 53 -  1/2024         Relevant Orders    Comprehensive Metabolic Panel (Completed)    CBC and Auto Differential (Completed)    Aortic aneurysm, thoracic (CMS-HCC) (Chronic)     Stable.  No increase  in size per echocardiogram 12/2023 vs March 2023.  (3.8cm on 12/2023 vs 4.4 3/2023)  Continue to monitor with imaging yearly - order pending from Dr. Jackson         Benign essential hypertension (Chronic)     Blood pressure in office today was   BP Readings from Last 1 Encounters:   07/30/24 135/85   Home readings reported to be <130/80  The goal range for blood pressure is below 130/80.   We will continue to monitor.   Did not tolerate losartan in past.  Continue lisinopril 5 mg and metoprolol 25 mg daily.         HFrEF (heart failure with reduced ejection fraction) (Multi)     Minimal plaquing on a cardiac catheterization from October 2022. EF 40%. Optimization of GDMT is being pursued.  continue with lisinopril metoprolol succinate. Not tolerate increased dose of metoprolol succinate due to bradycardia.    reluctant to increase the dose of lisinopril as his creatinine remains at 1.3.   Lasix reduced for creatinine - Dr Jackson recommended wean to as needed use at last visit         History of prostate cancer     status post brachii therapy 2005  PSA have consistently been undetectable  Repeat PSA level ordered.         Relevant Orders    PSA (Completed)    Nonrheumatic mitral valve regurgitation (Chronic)     Per Dr Jackson monitoring - his visit note from 7/25/2024:  His echo in December revealed mitral regurgitation was moderate with an EF of 40%. When I had seen him in March I was concerned because the murmur was very prominent in the axilla. On a repeat echo his mitral regurgitation remained mild and anteriorly directed. A repeat. Echo 12/2023 EF 50%, mild MR, mild functional MS gradient of 16mmHg. Today again, the mitral regurgitation murmur is more substantial than what would be anticipated based upon the mild mitral  regurgitation seen on his echo. I do suspect this is an eccentric jet and were just missing it. Follow-up echo will be done in December. We will also quantify his ejection fraction once again. He feels well and therefore no intervention is required at this time   He continues to have a significant murmur on exam. He feels terrific. A repeat echo will be done March 2024.         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   Follows with [facility] and cardiology Dr Jackson         Anemia due to stage 3a chronic kidney disease (Multi)     Check CBC for status  Historically mild  Lab Results   Component Value Date    WBC 6.9 01/30/2024    HGB 14.3 01/30/2024    HCT 45.2 01/30/2024    MCV 86 01/30/2024     01/30/2024            Relevant Orders    CBC and Auto Differential (Completed)    Mixed hyperlipidemia     Lab Results   Component Value Date    CHOL 201 (H) 01/30/2024    CHOL 189 01/09/2023    CHOL 231 (H) 01/03/2022     Lab Results   Component Value Date    HDL 56.8 01/30/2024    HDL 48.5 01/09/2023    HDL 57.7 01/03/2022     Lab Results   Component Value Date    LDLCALC 125 (H) 01/30/2024     Lab Results   Component Value Date    TRIG 95 01/30/2024    TRIG 98 01/09/2023    TRIG 90 01/03/2022   Stable.  Continue pravastatin 40 mg daily.         Vitamin D deficiency     1/30/2024 - 34          Follow up: Scheduled 2/4/2025    Scribe Attestation  By signing my name below, I, Joyce Hirsch   attest that this documentation has been prepared under the direction and in the presence of Ronnie Kay MD.

## 2024-07-30 NOTE — ASSESSMENT & PLAN NOTE
Blood pressure in office today was   BP Readings from Last 1 Encounters:   07/30/24 135/85   Home readings reported to be <130/80  The goal range for blood pressure is below 130/80.   We will continue to monitor.   Did not tolerate losartan in past.  Continue lisinopril 5 mg and metoprolol 25 mg daily.

## 2024-07-30 NOTE — ASSESSMENT & PLAN NOTE
Stable.  No increase  in size per echocardiogram 12/2023 vs March 2023.  (3.8cm on 12/2023 vs 4.4 3/2023)  Continue to monitor with imaging yearly - order pending from Dr. Jackson

## 2024-07-30 NOTE — ASSESSMENT & PLAN NOTE
Check CBC for status  Historically mild  Lab Results   Component Value Date    WBC 6.9 01/30/2024    HGB 14.3 01/30/2024    HCT 45.2 01/30/2024    MCV 86 01/30/2024     01/30/2024

## 2024-07-31 DIAGNOSIS — N18.31 STAGE 3A CHRONIC KIDNEY DISEASE (MULTI): Primary | Chronic | ICD-10-CM

## 2024-07-31 DIAGNOSIS — E87.5 HYPERKALEMIA: ICD-10-CM

## 2024-08-13 ENCOUNTER — TELEPHONE (OUTPATIENT)
Dept: PRIMARY CARE | Facility: CLINIC | Age: 84
End: 2024-08-13
Payer: MEDICARE

## 2024-08-13 NOTE — TELEPHONE ENCOUNTER
Patients wife called and said that this patient is having his teeth cleaned at the dentist on Thursday and is wondering if he needs antibiotics for this appointment like he has had in the past because of his surgical history.     They use [facility] in [place] for any prescriptions.

## 2024-08-14 ENCOUNTER — LAB (OUTPATIENT)
Dept: LAB | Facility: LAB | Age: 84
End: 2024-08-14
Payer: MEDICARE

## 2024-08-14 DIAGNOSIS — N18.31 STAGE 3A CHRONIC KIDNEY DISEASE (MULTI): Chronic | ICD-10-CM

## 2024-08-14 DIAGNOSIS — E87.5 HYPERKALEMIA: ICD-10-CM

## 2024-08-14 PROCEDURE — 80053 COMPREHEN METABOLIC PANEL: CPT

## 2024-08-14 PROCEDURE — 36415 COLL VENOUS BLD VENIPUNCTURE: CPT

## 2024-08-15 LAB
ALBUMIN SERPL BCP-MCNC: 4.4 G/DL (ref 3.4–5)
ALP SERPL-CCNC: 51 U/L (ref 33–136)
ALT SERPL W P-5'-P-CCNC: 20 U/L (ref 10–52)
ANION GAP SERPL CALC-SCNC: 14 MMOL/L (ref 10–20)
AST SERPL W P-5'-P-CCNC: 24 U/L (ref 9–39)
BILIRUB SERPL-MCNC: 1.8 MG/DL (ref 0–1.2)
BUN SERPL-MCNC: 40 MG/DL (ref 6–23)
CALCIUM SERPL-MCNC: 9.4 MG/DL (ref 8.6–10.6)
CHLORIDE SERPL-SCNC: 104 MMOL/L (ref 98–107)
CO2 SERPL-SCNC: 22 MMOL/L (ref 21–32)
CREAT SERPL-MCNC: 1.19 MG/DL (ref 0.5–1.3)
EGFRCR SERPLBLD CKD-EPI 2021: 61 ML/MIN/1.73M*2
GLUCOSE SERPL-MCNC: 84 MG/DL (ref 74–99)
POTASSIUM SERPL-SCNC: 5 MMOL/L (ref 3.5–5.3)
PROT SERPL-MCNC: 6.9 G/DL (ref 6.4–8.2)
SODIUM SERPL-SCNC: 135 MMOL/L (ref 136–145)

## 2024-08-22 ENCOUNTER — APPOINTMENT (OUTPATIENT)
Dept: CARDIOLOGY | Facility: CLINIC | Age: 84
End: 2024-08-22
Payer: MEDICARE

## 2024-09-14 DIAGNOSIS — E78.5 HYPERLIPIDEMIA, UNSPECIFIED HYPERLIPIDEMIA TYPE: ICD-10-CM

## 2024-09-16 RX ORDER — PRAVASTATIN SODIUM 40 MG/1
40 TABLET ORAL DAILY
Qty: 90 TABLET | Refills: 1 | Status: SHIPPED | OUTPATIENT
Start: 2024-09-16

## 2024-10-22 ENCOUNTER — OFFICE VISIT (OUTPATIENT)
Dept: ORTHOPEDIC SURGERY | Facility: HOSPITAL | Age: 84
End: 2024-10-22
Payer: MEDICARE

## 2024-10-22 DIAGNOSIS — M25.512 LEFT SHOULDER PAIN, UNSPECIFIED CHRONICITY: Primary | ICD-10-CM

## 2024-10-22 PROCEDURE — 2500000004 HC RX 250 GENERAL PHARMACY W/ HCPCS (ALT 636 FOR OP/ED): Performed by: ORTHOPAEDIC SURGERY

## 2024-10-22 PROCEDURE — 99213 OFFICE O/P EST LOW 20 MIN: CPT | Performed by: ORTHOPAEDIC SURGERY

## 2024-10-22 PROCEDURE — 20610 DRAIN/INJ JOINT/BURSA W/O US: CPT | Mod: LT | Performed by: ORTHOPAEDIC SURGERY

## 2024-10-22 PROCEDURE — 1123F ACP DISCUSS/DSCN MKR DOCD: CPT | Performed by: ORTHOPAEDIC SURGERY

## 2024-10-22 PROCEDURE — 1157F ADVNC CARE PLAN IN RCRD: CPT | Performed by: ORTHOPAEDIC SURGERY

## 2024-10-28 RX ORDER — LIDOCAINE HYDROCHLORIDE 10 MG/ML
4 INJECTION, SOLUTION INFILTRATION; PERINEURAL
Status: COMPLETED | OUTPATIENT
Start: 2024-10-22 | End: 2024-10-22

## 2024-10-28 RX ORDER — TRIAMCINOLONE ACETONIDE 40 MG/ML
40 INJECTION, SUSPENSION INTRA-ARTICULAR; INTRAMUSCULAR
Status: COMPLETED | OUTPATIENT
Start: 2024-10-22 | End: 2024-10-22

## 2024-12-04 ENCOUNTER — HOSPITAL ENCOUNTER (OUTPATIENT)
Dept: CARDIOLOGY | Facility: CLINIC | Age: 84
Discharge: HOME | End: 2024-12-04
Payer: MEDICARE

## 2024-12-04 DIAGNOSIS — Z95.818 STATUS POST IMPLANTATION OF MITRAL VALVE LEAFLET CLIP: Chronic | ICD-10-CM

## 2024-12-04 DIAGNOSIS — Z98.890 STATUS POST IMPLANTATION OF MITRAL VALVE LEAFLET CLIP: Chronic | ICD-10-CM

## 2024-12-04 DIAGNOSIS — I34.0 NONRHEUMATIC MITRAL VALVE REGURGITATION: Chronic | ICD-10-CM

## 2024-12-04 PROCEDURE — 93306 TTE W/DOPPLER COMPLETE: CPT | Performed by: INTERNAL MEDICINE

## 2024-12-04 PROCEDURE — 93306 TTE W/DOPPLER COMPLETE: CPT

## 2024-12-05 LAB
AORTIC VALVE MEAN GRADIENT: 2 MMHG
AORTIC VALVE PEAK VELOCITY: 0.91 M/S
AV PEAK GRADIENT: 3 MMHG
AVA (PEAK VEL): 3.21 CM2
AVA (VTI): 3.41 CM2
EJECTION FRACTION APICAL 4 CHAMBER: 47.4
EJECTION FRACTION: 48 %
LEFT ATRIUM VOLUME AREA LENGTH INDEX BSA: 138.4 ML/M2
LEFT VENTRICLE INTERNAL DIMENSION DIASTOLE: 5.19 CM (ref 3.5–6)
LEFT VENTRICULAR OUTFLOW TRACT DIAMETER: 2.47 CM
MITRAL VALVE E/A RATIO: 0.86
RIGHT VENTRICLE FREE WALL PEAK S': 12.3 CM/S
TRICUSPID ANNULAR PLANE SYSTOLIC EXCURSION: 2.3 CM

## 2025-01-20 ENCOUNTER — APPOINTMENT (OUTPATIENT)
Dept: ORTHOPEDIC SURGERY | Facility: CLINIC | Age: 85
End: 2025-01-20
Payer: MEDICARE

## 2025-01-21 ENCOUNTER — APPOINTMENT (OUTPATIENT)
Dept: ORTHOPEDIC SURGERY | Facility: HOSPITAL | Age: 85
End: 2025-01-21
Payer: MEDICARE

## 2025-01-26 NOTE — PROGRESS NOTES
Subjective    Patient ID: Josesito Tamayo is a 84 y.o. male.    Chief Complaint: Left shoulder pain    HPI  Mr. Tamayo is a 82-year-old male who is well known to us for evaluation of his right shoulder pain. Reporting today due to a new onset of left shoulder pain. He explains that he also had his shingles and Covid boosters injected into his left shoulder which exacerbated his symptoms. He has done well with injections in his right arm and is hoping for one in his left arm today.    His right shoulder is doing well and is a 4/10 at present. There has been no recent injury. He continues to enjoy playing golf. He wants to continue injections and avoid surgery if possible. He doesn't believe he needs a repeat injection in his right arm today. He notes that his shoulder is about 80% normal currently. He has had an ultrasound guided injection into his scapula in the past which helped his pain. He hopes for a repeat injection today.    08/01/23  He presents today not feeling well. He explains his left shoulder injection worked fairly well for a while. The last 8-9 weeks have been rough since it wore off and his appointment was cancelled.     10/10/23  Josesito returns to the clinic today for a repeat followup visit regarding his left shoulder.     He explains today that injections have been helping again. He is hoping to continue with these today.     01/09/24  Josesito returns to the clinic today for a repeat follow up regarding his left shoulder.     He reports his left shoulder improved immensely after his previous injection. He explains that on Sunday night he took two Tylenol and then woke up yesterday morning and was able to reach above his head. He does still have some discomfort around his bicep.     04/16/24  Josesito returns to the clinic today for a repeat follow up visit regarding his left shoulder.     He reports his shoulder is doing well. Injections have continued to help. He notes that he occasionally gets sharp pains  in his bicep and will notice locking when drinking water, but this resolves on its own.     07/23/24  Josesito Tamayo is a 84 y.o. male returning to the clinic for a repeat follow up visit regarding his left shoulder.    He is here today for a repeat injection. He continues to get enough sustained relief to continue them.     10/22/24  Josesito Tamayo is a 84 y.o. male returning to the clinic for a follow up visit regarding his left shoulder.    He returns today as his left shoulder has started to hurt. He is ready for a repeat injection    1/26/25  Josesito Tamayo is a 84 y.o. male returning to the clinic for a follow up visit regarding his left shoulder.    Past medical history, surgical history, social history, family history were all reviewed and are as per the [place] patient health history questionnaire form that I signed and scanned into the chart today.    Objective   Patient is a well-developed, well-nourished male in no acute distress.  Breathes with normal chest rises.  Pupils are round and symmetric today.  Awake, alert, and oriented x3.      Examination of the left shoulder today reveals the skin to be intact. There is no sign of any atrophy, lesions, or abrasions. There is no pain to palpation of the bony prominences. Cervical lymphadenopathy examined, and this was negative. Patient had 5 out of 5 wrist flexion, extension, and thumb extension bilaterally. Sensation was intact to light touch to median, ulnar, radial axillary, and musculocutaneous nerves bilaterally. Positive radial pulse bilaterally. Provocative maneuvers on the left side today were negative.  Range of motion of the left shoulder revealed 0-170° of forward elevation, 0-60° of external rotation, and internal rotation was to T-12.     Examination of the right shoulder today reveals the skin to be intact. There is no sign of any atrophy, lesions, or abrasions. There is no pain to palpation of the bony prominences. Cervical lymphadenopathy examined, and  this was negative. Patient had 5 out of 5 wrist flexion, extension, and thumb extension, bilaterally. Sensation was intact to light touch to median, ulnar, radial, axillary, and musculocutaneous nerves bilaterally. Positive radial pulse bilaterally. Provocative maneuvers on the right side today were negative.  Range of motion of the right shoulder revealed 0-170° of forward elevation, 0-60° of external rotation, and internal rotation up to T-12.     Image Results:  CT also shows a type A glenoid        Assessment/Plan   Encounter Diagnoses:  No diagnosis found.  Patient with largely resolved right shoulder pain with underlying chronic issue that flares up, as well as endstage arthritis of the left shoulder    At this time we had a long discussion about the various options for shoulder arthritis.  1. Do nothing. Continue activity modifications.  2. Consider cortisone injections into the glenohumeral joint. This would give pain relief that is temporary. This would not stop the progression of arthritis. For some patients, this injection can last not at all, for 2 weeks, 4 weeks, 3 months or longer. The risk of the injection is fairly minimal but does include a very small chance of infection.  3. A total shoulder replacement is the third option. This will give the patient a more permanent solution to pain relief. It would also improve function. There is no rush to this procedure it is an elective procedure.    We did a repeat injection for him today. He tolerated this well. We will see him back in 3 and a half months for a repeat injection at that time. If he feels that he needs a repeat injection at 3 months he is to call us and let us know.     No orders of the defined types were placed in this encounter.    Follow up in 3 and a half months.     Scribe Attestation  By signing my name below, Britni RODRIGUEZ Scribe   attest that this documentation has been prepared under the direction and in the presence of Adelfo RAJPUT  MD Chano.

## 2025-01-27 ENCOUNTER — APPOINTMENT (OUTPATIENT)
Dept: OTOLARYNGOLOGY | Facility: CLINIC | Age: 85
End: 2025-01-27
Payer: MEDICARE

## 2025-01-27 ENCOUNTER — APPOINTMENT (OUTPATIENT)
Dept: AUDIOLOGY | Facility: CLINIC | Age: 85
End: 2025-01-27
Payer: MEDICARE

## 2025-01-27 ENCOUNTER — OFFICE VISIT (OUTPATIENT)
Dept: ORTHOPEDIC SURGERY | Facility: CLINIC | Age: 85
End: 2025-01-27
Payer: MEDICARE

## 2025-01-27 DIAGNOSIS — M25.512 LEFT SHOULDER PAIN, UNSPECIFIED CHRONICITY: Primary | ICD-10-CM

## 2025-01-27 PROCEDURE — 99213 OFFICE O/P EST LOW 20 MIN: CPT | Performed by: ORTHOPAEDIC SURGERY

## 2025-01-27 PROCEDURE — 1123F ACP DISCUSS/DSCN MKR DOCD: CPT | Performed by: ORTHOPAEDIC SURGERY

## 2025-01-27 PROCEDURE — 1157F ADVNC CARE PLAN IN RCRD: CPT | Performed by: ORTHOPAEDIC SURGERY

## 2025-01-30 ENCOUNTER — APPOINTMENT (OUTPATIENT)
Dept: CARDIOLOGY | Facility: CLINIC | Age: 85
End: 2025-01-30
Payer: MEDICARE

## 2025-01-31 ENCOUNTER — OFFICE VISIT (OUTPATIENT)
Dept: CARDIOLOGY | Facility: CLINIC | Age: 85
End: 2025-01-31
Payer: MEDICARE

## 2025-01-31 VITALS
SYSTOLIC BLOOD PRESSURE: 151 MMHG | HEART RATE: 63 BPM | DIASTOLIC BLOOD PRESSURE: 95 MMHG | WEIGHT: 183 LBS | HEIGHT: 70 IN | OXYGEN SATURATION: 95 % | BODY MASS INDEX: 26.2 KG/M2

## 2025-01-31 DIAGNOSIS — Z98.890 STATUS POST IMPLANTATION OF MITRAL VALVE LEAFLET CLIP: Chronic | ICD-10-CM

## 2025-01-31 DIAGNOSIS — Z95.818 STATUS POST IMPLANTATION OF MITRAL VALVE LEAFLET CLIP: Chronic | ICD-10-CM

## 2025-01-31 DIAGNOSIS — I34.0 NONRHEUMATIC MITRAL VALVE REGURGITATION: Chronic | ICD-10-CM

## 2025-01-31 DIAGNOSIS — E78.2 MIXED HYPERLIPIDEMIA: ICD-10-CM

## 2025-01-31 DIAGNOSIS — I10 BENIGN ESSENTIAL HYPERTENSION: Primary | Chronic | ICD-10-CM

## 2025-01-31 DIAGNOSIS — I42.9 CARDIOMYOPATHY, IDIOPATHIC (MULTI): Chronic | ICD-10-CM

## 2025-01-31 PROBLEM — N18.31 ANEMIA DUE TO STAGE 3A CHRONIC KIDNEY DISEASE (MULTI): Status: RESOLVED | Noted: 2024-01-30 | Resolved: 2025-01-31

## 2025-01-31 PROBLEM — D63.1 ANEMIA DUE TO STAGE 3A CHRONIC KIDNEY DISEASE (MULTI): Status: RESOLVED | Noted: 2024-01-30 | Resolved: 2025-01-31

## 2025-01-31 PROBLEM — H90.3 BILATERAL SENSORINEURAL HEARING LOSS: Status: RESOLVED | Noted: 2023-04-19 | Resolved: 2025-01-31

## 2025-01-31 PROCEDURE — 99214 OFFICE O/P EST MOD 30 MIN: CPT | Performed by: INTERNAL MEDICINE

## 2025-01-31 PROCEDURE — 1160F RVW MEDS BY RX/DR IN RCRD: CPT | Performed by: INTERNAL MEDICINE

## 2025-01-31 PROCEDURE — 3077F SYST BP >= 140 MM HG: CPT | Performed by: INTERNAL MEDICINE

## 2025-01-31 PROCEDURE — 1159F MED LIST DOCD IN RCRD: CPT | Performed by: INTERNAL MEDICINE

## 2025-01-31 PROCEDURE — 1123F ACP DISCUSS/DSCN MKR DOCD: CPT | Performed by: INTERNAL MEDICINE

## 2025-01-31 PROCEDURE — 1157F ADVNC CARE PLAN IN RCRD: CPT | Performed by: INTERNAL MEDICINE

## 2025-01-31 PROCEDURE — 1126F AMNT PAIN NOTED NONE PRSNT: CPT | Performed by: INTERNAL MEDICINE

## 2025-01-31 PROCEDURE — 1036F TOBACCO NON-USER: CPT | Performed by: INTERNAL MEDICINE

## 2025-01-31 PROCEDURE — 3080F DIAST BP >= 90 MM HG: CPT | Performed by: INTERNAL MEDICINE

## 2025-01-31 ASSESSMENT — ENCOUNTER SYMPTOMS
LOSS OF SENSATION IN FEET: 0
DEPRESSION: 0
OCCASIONAL FEELINGS OF UNSTEADINESS: 0

## 2025-01-31 ASSESSMENT — PATIENT HEALTH QUESTIONNAIRE - PHQ9
2. FEELING DOWN, DEPRESSED OR HOPELESS: NOT AT ALL
1. LITTLE INTEREST OR PLEASURE IN DOING THINGS: NOT AT ALL
SUM OF ALL RESPONSES TO PHQ9 QUESTIONS 1 AND 2: 0

## 2025-01-31 ASSESSMENT — COLUMBIA-SUICIDE SEVERITY RATING SCALE - C-SSRS
2. HAVE YOU ACTUALLY HAD ANY THOUGHTS OF KILLING YOURSELF?: NO
1. IN THE PAST MONTH, HAVE YOU WISHED YOU WERE DEAD OR WISHED YOU COULD GO TO SLEEP AND NOT WAKE UP?: NO
6. HAVE YOU EVER DONE ANYTHING, STARTED TO DO ANYTHING, OR PREPARED TO DO ANYTHING TO END YOUR LIFE?: NO

## 2025-01-31 ASSESSMENT — PAIN SCALES - GENERAL: PAINLEVEL_OUTOF10: 0-NO PAIN

## 2025-01-31 NOTE — PATIENT INSTRUCTIONS
1. Mitral regurgitation. Severe. Prolapse of the P2 segment of the posterior leaflet. He had 2 mitral clips placed December 2022. At the end of the procedure he had trace to mild regurgitation.  Subsequent echoes have revealed mild maybe mild to moderate mitral regurgitation.  On examination his murmur is disproportionately mild.  Likely due to eccentric jet.TTE 12/4/2024 EF 45 to 50%, mild to moderate MR 2 mitral clips present.  Will continue to monitor.    2. Nonischemic cardiomyopathy. Minimal plaquing on a cardiac catheterization from October 2022. EF 40%.  His most recent echo shows an ejection fraction of 50%.  Continue lisinopril metoprolol.  Unable to uptitrate the dose of metoprolol because of an underlying bradycardia.  Creatinine stable at 1.3.  He is totally off Lasix at this time.  EF 45 to 50% 12/4/2024.  No volume overload.  Doing great without Lasix    3. Frequent PVCs. Many are heard on examination today.     4. Hypertension.  Blood pressures at home are excellent    5. Acute kidney injury. His creatinine is stable at 1.3.  He is completely off of Lasix.  1/30/2024 BUN 29 creatinine 1.34    6.  Thoracic aortic aneurysm.  4.4 cm on his echo 3/7/2023.  Not seen on the most recent echo    Overall from a cardiac standpoint he is doing great.  My plan is to see him back in approximately 6 months to a year.  Sooner if any issues arise.

## 2025-01-31 NOTE — PROGRESS NOTES
Referred by No ref. provider found    HPI I am seeing Regis for a 6-month follow-up.  He continues to feel terrific.  No chest pain or pressure no shortness of breath no palpitations.    Past Medical History:  Problem List Items Addressed This Visit    None     Past Medical History:   Diagnosis Date    Aortic aneurysm, thoracic (CMS-HCC) 04/19/2023    4 cm per echo of 7/2020   4.4 cm per echo of 3/2023    Benign essential hypertension 04/19/2023    Bursitis of unspecified shoulder 05/19/2020    Scapulothoracic bursitis    Candidiasis of skin and nail 05/22/2019    Candidal dermatitis    Cardiomyopathy, idiopathic (Multi) 02/14/2024    Chronic rhinitis 10/09/2017    Rhinitis    Disorder of male genital organs, unspecified 07/11/2016    Disorder of scrotum    Dysuria 04/19/2023    Encounter for immunization 06/15/2021    Encounter for immunization    Encounter for immunization     Immunization due    Encounter for other preprocedural examination 11/16/2022    Preoperative clearance    Encounter for screening for depression 03/02/2022    Screening for depression    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 Kay follows    Laceration without foreign body of unspecified finger without damage to nail, initial encounter 06/15/2021    Finger laceration    Malignant neoplasm of prostate (Multi) 01/20/2024    Nonrheumatic mitral valve regurgitation 04/19/2023    P2 PMVL prolapse with severe ant-directed MR … EF dropped to 40-45% in 10/2022 on KATE   2 MitraClip 12/1/2022    Other conditions influencing health status 10/09/2017    History of cough    Other difficulties with micturition 07/11/2016    Abnormal urination    Other difficulties with micturition 07/11/2016    Abnormal urinary stream    Other shoulder lesions, unspecified shoulder 04/22/2015    Rotator cuff tendinitis    Other specified disorders of ear, unspecified ear 11/27/2017    Ear fullness    Other specified disorders of eustachian tube,  right ear 11/10/2017    Dysfunction of right eustachian tube    Other specified personal risk factors, not elsewhere classified 11/25/2019    Pneumococcal vaccination indicated    Other specified personal risk factors, not elsewhere classified 11/21/2018    Pneumococcal vaccination indicated    Other symptoms and signs involving the genitourinary system 07/11/2016    Abnormal prostate exam    Pain in unspecified knee 01/05/2015    Knee pain    Personal history of malignant neoplasm of prostate 12/28/2020    History of prostate cancer    Personal history of other diseases of the circulatory system     History of hypertension    Personal history of other diseases of the circulatory system     Personal history of cardiac murmur    Personal history of other diseases of the nervous system and sense organs 11/27/2017    History of eustachian tube dysfunction    Personal history of other endocrine, nutritional and metabolic disease     History of high cholesterol    Personal history of other infectious and parasitic diseases     History of mumps    Personal history of other specified conditions 02/20/2018    History of urinary frequency    Personal history of other specified conditions     History of urinary frequency    Personal history of other specified conditions 07/21/2022    History of dysuria    Personal history of other specified conditions 04/01/2015    History of gross hematuria    Personal history of urinary (tract) infections 08/10/2018    History of recurrent urinary tract infection    Personal history of urinary (tract) infections 07/11/2016    History of urinary tract infection    Stage 3a chronic kidney disease (Multi) 04/19/2023    Status post implantation of mitral valve leaflet clip 04/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      Social History:  He reports that he has never smoked. He has never used smokeless tobacco. He reports that he does not currently use alcohol. He reports that he does not use drugs.    Family History:  Family History   Problem Relation Name Age of Onset    Hyperlipidemia Other      Prostate cancer Other      Hypertension Other       Allergies:  Losartan    Outpatient Medications:  Current Outpatient Medications   Medication Instructions    aspirin 81 mg EC tablet 1 tablet, oral, Daily    cholecalciferol (Vitamin D-3) 50 mcg (2,000 unit) capsule 1 capsule, oral, Daily    lisinopril 5 mg, oral, Daily    metoprolol succinate XL (TOPROL-XL) 25 mg, oral, Daily    pravastatin (PRAVACHOL) 40 mg, oral, Daily     Last Recorded Vitals:  There were no vitals filed for this visit.    Physical Exam  Patient is alert and oriented x3.  HEENT is unremarkable mucous members are moist  Neck no JVP no bruits upstrokes are full no thyromegaly  Lungs are clear bilaterally.  No wheezing crackles or rales  Heart regular rhythm with frequent ectopy normal S1-S2 there is no S3 2/6 to 3/6 holosystolic murmur throughout the precordium loudest in the left axilla abdomen is soft bs are positive nontender nondistended no organomegaly no pulsatile masses  Extremities have no edema.  Distal pulses present palpable.  Neuro is grossly nonfocal  Skin has no rashes     Last Labs:  CBC -  Lab Results   Component Value Date    WBC 7.8 07/30/2024    HGB 14.2 07/30/2024    HCT 43.7 07/30/2024    MCV 85 07/30/2024     07/30/2024     CMP -  Lab Results   Component Value Date    CALCIUM 9.4 08/14/2024    PHOS 4.7 12/02/2022    PROT 6.9 08/14/2024    ALBUMIN 4.4 08/14/2024    AST 24 08/14/2024    ALT 20  "08/14/2024    ALKPHOS 51 08/14/2024    BILITOT 1.8 (H) 08/14/2024     LIPID PANEL -   Lab Results   Component Value Date    CHOL 201 (H) 01/30/2024    HDL 56.8 01/30/2024    CHHDL 3.5 01/30/2024    VLDL 19 01/30/2024    TRIG 95 01/30/2024    NHDL 144 01/30/2024     RENAL FUNCTION PANEL -   Lab Results   Component Value Date    K 5.0 08/14/2024    PHOS 4.7 12/02/2022     No results found for: \"BNP\", \"HGBA1C\"       Procedure    TTE 12/4/2024 EF 45 to 50%, mild to moderate MR 2 mitral clips present    Echo 12/22/23 EF 50%, Mod PIOTR, MVT, 2 mitraclips with mild MR and  transmitral gradient of 16mmHg    ECHO [03/07/2023]: Est EF 40%. SD = impaired relaxation pattern. LA mild-mod dial. Two mitral clips present. Mild anteriorly-directed MR present. Tricuspid valve is abnormal. Tricuspid valve leaflets thickened and redundant. AV sclerosis. TA mildly dial @ 4.4 cm.     ECHO [12/28/2022]: Est EF 40%. SD = impaired relaxation pattern. LA mod-severely dial. Mitral clip present. Residual moderate centrally-directed MR.     ECHO [12/02/2022]: EF 45-50%. SD = abnormal pattern. Elev mean LA pressure. Mildly reduced RVSF. LA severely dial. MV clips present w/trace-mild MR and no stenosis. RVSP mildly elev (34.8 mmHg). Mild AR. LV Global Longitudinal Strain -9.7%. Strain values abnormal, c/w impaired LV function. Global hypok. Of LV w/minor regional variations. Mod dial ascending aorta (4.5 cm).     MitraClip [12/01/2022, Dr. Caren Dominguez]: Successful percutanous edge to edge mitral valve repair with 2 MitraClip devices.      Left & Right Heart Cath [10/27/2022, Dr. Neftali Sosa]: Angiographically normal left main. 30% mid LAD stenosis after a large diagonal branch with luminal irregularities in the rest of the artery. Luminal irregularities throughout left circumflex system. Large dominant RCA with luminal irregularities throughout the vessel. Right dominant coronary artery system. Normal cardiac output. Elevated filling " pressures. Conclusions: nonobstructive CAD     AA U/S (12/05/2018): No aneurysm.     Assessment/Plan   1. Mitral regurgitation. Severe. Prolapse of the P2 segment of the posterior leaflet. He had 2 mitral clips placed December 2022. At the end of the procedure he had trace to mild regurgitation.  Subsequent echoes have revealed mild maybe mild to moderate mitral regurgitation.  On examination his murmur is disproportionately mild.  Likely due to eccentric jet.TTE 12/4/2024 EF 45 to 50%, mild to moderate MR 2 mitral clips present.  Will continue to monitor.    2. Nonischemic cardiomyopathy. Minimal plaquing on a cardiac catheterization from October 2022. EF 40%.  His most recent echo shows an ejection fraction of 50%.  Continue lisinopril metoprolol.  Unable to uptitrate the dose of metoprolol because of an underlying bradycardia.  Creatinine stable at 1.3.  He is totally off Lasix at this time.  EF 45 to 50% 12/4/2024.  No volume overload.  Doing great without Lasix    3. Frequent PVCs. Many are heard on examination today.     4. Hypertension.  Blood pressures at home are excellent    5. Acute kidney injury. His creatinine is stable at 1.3.  He is completely off of Lasix.  1/30/2024 BUN 29 creatinine 1.34    6.  Thoracic aortic aneurysm.  4.4 cm on his echo 3/7/2023.  Not seen on the most recent echo    Overall from a cardiac standpoint he is doing great.  My plan is to see him back in approximately 6 months to a year.  Sooner if any issues arise.  Lyndsay Jackson MD     Instructions and follow up

## 2025-02-04 ENCOUNTER — APPOINTMENT (OUTPATIENT)
Dept: PRIMARY CARE | Facility: CLINIC | Age: 85
End: 2025-02-04
Payer: MEDICARE

## 2025-02-04 VITALS
HEART RATE: 55 BPM | DIASTOLIC BLOOD PRESSURE: 70 MMHG | WEIGHT: 176.8 LBS | TEMPERATURE: 97.3 F | SYSTOLIC BLOOD PRESSURE: 123 MMHG | RESPIRATION RATE: 12 BRPM | BODY MASS INDEX: 25.31 KG/M2 | OXYGEN SATURATION: 95 % | HEIGHT: 70 IN

## 2025-02-04 DIAGNOSIS — Z78.9 SELF-CATHETERIZES URINARY BLADDER: ICD-10-CM

## 2025-02-04 DIAGNOSIS — N18.31 STAGE 3A CHRONIC KIDNEY DISEASE (MULTI): Chronic | ICD-10-CM

## 2025-02-04 DIAGNOSIS — Z00.00 ROUTINE GENERAL MEDICAL EXAMINATION AT HEALTH CARE FACILITY: ICD-10-CM

## 2025-02-04 DIAGNOSIS — I10 BENIGN ESSENTIAL HYPERTENSION: Chronic | ICD-10-CM

## 2025-02-04 DIAGNOSIS — R01.1 HEART MURMUR: ICD-10-CM

## 2025-02-04 DIAGNOSIS — I42.9 CARDIOMYOPATHY, IDIOPATHIC (MULTI): Chronic | ICD-10-CM

## 2025-02-04 DIAGNOSIS — E78.2 MIXED HYPERLIPIDEMIA: ICD-10-CM

## 2025-02-04 DIAGNOSIS — I71.21 ANEURYSM OF ASCENDING AORTA WITHOUT RUPTURE (CMS-HCC): Primary | Chronic | ICD-10-CM

## 2025-02-04 DIAGNOSIS — I34.0 NONRHEUMATIC MITRAL VALVE REGURGITATION: Chronic | ICD-10-CM

## 2025-02-04 DIAGNOSIS — E55.9 VITAMIN D DEFICIENCY: ICD-10-CM

## 2025-02-04 DIAGNOSIS — Z85.46 HISTORY OF PROSTATE CANCER: ICD-10-CM

## 2025-02-04 DIAGNOSIS — I50.20 HFREF (HEART FAILURE WITH REDUCED EJECTION FRACTION): ICD-10-CM

## 2025-02-04 DIAGNOSIS — I49.3 FREQUENT PVCS: Chronic | ICD-10-CM

## 2025-02-04 PROCEDURE — 3078F DIAST BP <80 MM HG: CPT | Performed by: FAMILY MEDICINE

## 2025-02-04 PROCEDURE — 1123F ACP DISCUSS/DSCN MKR DOCD: CPT | Performed by: FAMILY MEDICINE

## 2025-02-04 PROCEDURE — 90662 IIV NO PRSV INCREASED AG IM: CPT | Performed by: FAMILY MEDICINE

## 2025-02-04 PROCEDURE — 1160F RVW MEDS BY RX/DR IN RCRD: CPT | Performed by: FAMILY MEDICINE

## 2025-02-04 PROCEDURE — 1157F ADVNC CARE PLAN IN RCRD: CPT | Performed by: FAMILY MEDICINE

## 2025-02-04 PROCEDURE — 1159F MED LIST DOCD IN RCRD: CPT | Performed by: FAMILY MEDICINE

## 2025-02-04 PROCEDURE — G0008 ADMIN INFLUENZA VIRUS VAC: HCPCS | Performed by: FAMILY MEDICINE

## 2025-02-04 PROCEDURE — G0439 PPPS, SUBSEQ VISIT: HCPCS | Performed by: FAMILY MEDICINE

## 2025-02-04 PROCEDURE — 1170F FXNL STATUS ASSESSED: CPT | Performed by: FAMILY MEDICINE

## 2025-02-04 PROCEDURE — 1036F TOBACCO NON-USER: CPT | Performed by: FAMILY MEDICINE

## 2025-02-04 PROCEDURE — 3074F SYST BP LT 130 MM HG: CPT | Performed by: FAMILY MEDICINE

## 2025-02-04 NOTE — ASSESSMENT & PLAN NOTE
Improved with 3.7cm dilation on Echo 12/2024  (3.8cm on 12/2023 vs 4.4 3/2023)  Continue to monitor with imaging yearly   Continue current treatment plan with goal BP <130/80

## 2025-02-04 NOTE — PROGRESS NOTES
"Subjective   Reason for Visit: Josesito Tamayo is an 84 y.o. male here for a Medicare Wellness visit.          Reviewed all medications by prescribing practitioner or clinical pharmacist (such as prescriptions, OTCs, herbal therapies and supplements) and documented in the medical record.    HPI    Patient Care Team:  Ronnie Kay MD as PCP - General  Ronnie Kay MD as PCP - [employer] PCP     Review of Systems    Objective   Vitals:  /70 (BP Location: Right arm, Patient Position: Sitting, BP Cuff Size: Adult)   Pulse 55   Temp 36.3 °C (97.3 °F) (Temporal)   Resp 12   Ht 1.778 m (5' 10\")   Wt 80.2 kg (176 lb 12.8 oz)   SpO2 95%   BMI 25.37 kg/m²       Physical Exam  Constitutional:       Appearance: Normal appearance. He is normal weight.   HENT:      Head: Normocephalic and atraumatic.   Cardiovascular:      Rate and Rhythm: Normal rate and regular rhythm.      Pulses: Normal pulses.      Heart sounds: Murmur heard.      Crescendo systolic murmur is present with a grade of 3/6.   Pulmonary:      Effort: Pulmonary effort is normal.      Breath sounds: Normal breath sounds.   Abdominal:      General: Abdomen is flat.      Palpations: Abdomen is soft.   Neurological:      Mental Status: He is alert.         Assessment & Plan  Aneurysm of ascending aorta without rupture (CMS-HCC)  Improved with 3.7cm dilation on Echo 12/2024  (3.8cm on 12/2023 vs 4.4 3/2023)  Continue to monitor with imaging yearly   Continue current treatment plan with goal BP <130/80         Benign essential hypertension  Blood pressure in office today was   BP Readings from Last 1 Encounters:   02/04/25 123/70   Home readings reported to be <130/80  The goal range for blood pressure is below 130/80.   Did not tolerate losartan in past.  Continue lisinopril 5 mg and metoprolol 25 mg daily.         Cardiomyopathy, idiopathic (Multi)  Per Dr Jackson 1/30/2025 visit  Nonischemic cardiomyopathy. Minimal plaquing on a cardiac " catheterization from October 2022. EF 40%.  His most recent echo shows an ejection fraction of 50%.  Continue lisinopril metoprolol.  Unable to uptitrate the dose of metoprolol because of an underlying bradycardia.  Creatinine stable at 1.3.  He is totally off Lasix at this time.  EF 45 to 50% 12/4/2024.  No volume overload.  Doing great without Lasix          HFrEF (heart failure with reduced ejection fraction)  Echo 12/2024 EF 40%  Stable  No symptoms         Mixed hyperlipidemia         Vitamin D deficiency  Repeat levels next visit 6 months  Prior levels indicate adequate supplenmentation         Stage 3a chronic kidney disease (Multi)         History of prostate cancer  status post brachii therapy 2005  PSA have consistently been undetectable  Repeat PSA due 7/2025         Routine general medical examination at health care facility    Orders:    1 Year Follow Up In [facility] - PCP - Wellness Exam; Future    Nonrheumatic mitral valve regurgitation  Per Dr Jackson monitoring - his visit note from 1/30/2025  Severe. Prolapse of the P2 segment of the posterior leaflet. He had 2 mitral clips placed December 2022. At the end of the procedure he had trace to mild regurgitation.  Subsequent echoes have revealed mild maybe mild to moderate mitral regurgitation.  On examination his murmur is disproportionately mild.  Likely due to eccentric jet.TTE 12/4/2024 EF 45 to 50%, mild to moderate MR 2 mitral clips present.  Will continue to monitor.          Frequent PVCs  No symptoms  Continue metoprolol         Heart murmur  Due to MR  Echo 12/2024  TTE 12/4/2024 EF 45 to 50%, mild to moderate MR 2 mitral clips present.  Will continue to monitor.     Murmur crescendo systolic grade 3         Self-catheterizes urinary bladder  Intermittent need  No change in frequency or severity of symptoms

## 2025-02-04 NOTE — ASSESSMENT & PLAN NOTE
Per Dr Jackson monitoring - his visit note from 1/30/2025  Severe. Prolapse of the P2 segment of the posterior leaflet. He had 2 mitral clips placed December 2022. At the end of the procedure he had trace to mild regurgitation.  Subsequent echoes have revealed mild maybe mild to moderate mitral regurgitation.  On examination his murmur is disproportionately mild.  Likely due to eccentric jet.TTE 12/4/2024 EF 45 to 50%, mild to moderate MR 2 mitral clips present.  Will continue to monitor.

## 2025-02-04 NOTE — ASSESSMENT & PLAN NOTE
Blood pressure in office today was   BP Readings from Last 1 Encounters:   02/04/25 123/70   Home readings reported to be <130/80  The goal range for blood pressure is below 130/80.   Did not tolerate losartan in past.  Continue lisinopril 5 mg and metoprolol 25 mg daily.

## 2025-02-04 NOTE — ASSESSMENT & PLAN NOTE
status post brachii therapy 2005  PSA have consistently been undetectable  Repeat PSA due 7/2025

## 2025-02-04 NOTE — ASSESSMENT & PLAN NOTE
Due to MR  Echo 12/2024  TTE 12/4/2024 EF 45 to 50%, mild to moderate MR 2 mitral clips present.  Will continue to monitor.     Murmur crescendo systolic grade 3

## 2025-02-04 NOTE — ASSESSMENT & PLAN NOTE
Per Dr Jackson 1/30/2025 visit  Nonischemic cardiomyopathy. Minimal plaquing on a cardiac catheterization from October 2022. EF 40%.  His most recent echo shows an ejection fraction of 50%.  Continue lisinopril metoprolol.  Unable to uptitrate the dose of metoprolol because of an underlying bradycardia.  Creatinine stable at 1.3.  He is totally off Lasix at this time.  EF 45 to 50% 12/4/2024.  No volume overload.  Doing great without Lasix

## 2025-03-06 DIAGNOSIS — I10 BENIGN ESSENTIAL HYPERTENSION: Chronic | ICD-10-CM

## 2025-03-06 DIAGNOSIS — E78.5 HYPERLIPIDEMIA, UNSPECIFIED HYPERLIPIDEMIA TYPE: ICD-10-CM

## 2025-03-07 RX ORDER — PRAVASTATIN SODIUM 40 MG/1
40 TABLET ORAL DAILY
Qty: 90 TABLET | Refills: 3 | Status: SHIPPED | OUTPATIENT
Start: 2025-03-07

## 2025-03-08 RX ORDER — LISINOPRIL 5 MG/1
5 TABLET ORAL DAILY
Qty: 90 TABLET | Refills: 3 | Status: SHIPPED | OUTPATIENT
Start: 2025-03-08

## 2025-03-21 DIAGNOSIS — I49.3 FREQUENT PVCS: Chronic | ICD-10-CM

## 2025-03-24 RX ORDER — METOPROLOL SUCCINATE 25 MG/1
25 TABLET, EXTENDED RELEASE ORAL DAILY
Qty: 90 TABLET | Refills: 3 | Status: SHIPPED | OUTPATIENT
Start: 2025-03-24

## 2025-04-27 NOTE — PROGRESS NOTES
Subjective    Patient ID: Josesito Tamayo is a 84 y.o. male.    Chief Complaint: Left shoulder pain    HPI  Mr. Tamayo is a 82-year-old male who is well known to us for evaluation of his right shoulder pain. Reporting today due to a new onset of left shoulder pain. He explains that he also had his shingles and Covid boosters injected into his left shoulder which exacerbated his symptoms. He has done well with injections in his right arm and is hoping for one in his left arm today.    His right shoulder is doing well and is a 4/10 at present. There has been no recent injury. He continues to enjoy playing golf. He wants to continue injections and avoid surgery if possible. He doesn't believe he needs a repeat injection in his right arm today. He notes that his shoulder is about 80% normal currently. He has had an ultrasound guided injection into his scapula in the past which helped his pain. He hopes for a repeat injection today.    08/01/23  He presents today not feeling well. He explains his left shoulder injection worked fairly well for a while. The last 8-9 weeks have been rough since it wore off and his appointment was cancelled.     10/10/23  Josesito returns to the clinic today for a repeat followup visit regarding his left shoulder.     He explains today that injections have been helping again. He is hoping to continue with these today.     01/09/24  Josesito returns to the clinic today for a repeat follow up regarding his left shoulder.     He reports his left shoulder improved immensely after his previous injection. He explains that on Sunday night he took two Tylenol and then woke up yesterday morning and was able to reach above his head. He does still have some discomfort around his bicep.     04/16/24  Josesito returns to the clinic today for a repeat follow up visit regarding his left shoulder.     He reports his shoulder is doing well. Injections have continued to help. He notes that he occasionally gets sharp pains  in his bicep and will notice locking when drinking water, but this resolves on its own.     07/23/24  Josesito Tamayo is a 84 y.o. male returning to the clinic for a repeat follow up visit regarding his left shoulder.    He is here today for a repeat injection. He continues to get enough sustained relief to continue them.     10/22/24  Josesito Tamayo is a 84 y.o. male returning to the clinic for a follow up visit regarding his left shoulder.    He returns today as his left shoulder has started to hurt. He is ready for a repeat injection    01/27/25  Josesito Tamayo is a 84 y.o. male returning to the clinic for a follow up visit regarding his left shoulder.    4/27/25  Josesito Tamayo is a 84 y.o. male returning to the clinic for a follow up visit regarding his left shoulder.    He explains that he would like two injections today. His left shoulder has been bothersome, but the right has just recently started acting up for him.     Past medical history, surgical history, social history, family history were all reviewed and are as per the [facility] patient health history questionnaire form that I signed and scanned into the chart today.    Objective   Patient is a well-developed, well-nourished male in no acute distress.  Breathes with normal chest rises.  Pupils are round and symmetric today.  Awake, alert, and oriented x3.      Examination of the left shoulder today reveals the skin to be intact. There is no sign of any atrophy, lesions, or abrasions. There is no pain to palpation of the bony prominences. Cervical lymphadenopathy examined, and this was negative. Patient had 5 out of 5 wrist flexion, extension, and thumb extension bilaterally. Sensation was intact to light touch to median, ulnar, radial axillary, and musculocutaneous nerves bilaterally. Positive radial pulse bilaterally. Provocative maneuvers on the left side today were negative.  Range of motion of the left shoulder revealed 0-170° of forward elevation, 0-60° of  external rotation, and internal rotation was to T-12.     Examination of the right shoulder today reveals the skin to be intact. There is no sign of any atrophy, lesions, or abrasions. There is no pain to palpation of the bony prominences. Cervical lymphadenopathy examined, and this was negative. Patient had 5 out of 5 wrist flexion, extension, and thumb extension, bilaterally. Sensation was intact to light touch to median, ulnar, radial, axillary, and musculocutaneous nerves bilaterally. Positive radial pulse bilaterally. Provocative maneuvers on the right side today were negative.  Range of motion of the right shoulder revealed 0-170° of forward elevation, 0-60° of external rotation, and internal rotation up to T-12.     Image Results:  CT also shows a type A glenoid    L Inj/Asp: L glenohumeral on 4/28/2025 10:41 AM  Indications: pain  Details: 20 G needle, anterior approach  Medications: 40 mg triamcinolone acetonide 40 mg/mL; 4 mL lidocaine 10 mg/mL (1 %)  Consent was given by the patient. Immediately prior to procedure a time out was called to verify the correct patient, procedure, equipment, support staff and site/side marked as required. Patient was prepped and draped in the usual sterile fashion.       L Inj/Asp: R glenohumeral on 4/28/2025 10:41 AM  Indications: pain  Details: 20 G needle, anterior approach  Medications: 40 mg triamcinolone acetonide 40 mg/mL; 4 mL lidocaine 10 mg/mL (1 %)  Outcome: tolerated well, no immediate complications  Procedure, treatment alternatives, risks and benefits explained, specific risks discussed. Consent was given by the patient. Immediately prior to procedure a time out was called to verify the correct patient, procedure, equipment, support staff and site/side marked as required. Patient was prepped and draped in the usual sterile fashion.             Assessment/Plan   Encounter Diagnoses:  No diagnosis found.  Patient with largely resolved right shoulder pain with  underlying chronic issue that flares up, as well as endstage arthritis of the left shoulder    At this time we had a long discussion about the various options for shoulder arthritis.  1. Do nothing. Continue activity modifications.  2. Consider cortisone injections into the glenohumeral joint. This would give pain relief that is temporary. This would not stop the progression of arthritis. For some patients, this injection can last not at all, for 2 weeks, 4 weeks, 3 months or longer. The risk of the injection is fairly minimal but does include a very small chance of infection.  3. A total shoulder replacement is the third option. This will give the patient a more permanent solution to pain relief. It would also improve function. There is no rush to this procedure it is an elective procedure.    He would like to proceed with an injection today for their BILATERAL shoulder today. They tolerated the BILATERAL shoulder injection very well today. We will see how they do after the injection. They are aware that there can be a spike in glucose levels following an injection.    At this time, we will also have the patient complete out-patient physical therapy as part of the conservative management of their shoulder arthritis. They were given a prescription for this today. They were also given our provider directed home exercise program, along with correlating website for home therapy. The therapy should be performed 2-3 times a day and should take about 10-15 minutes to perform each time.     If they do not get sustained relief from the injection and therapy, consideration for a repeat injection can be made versus advanced imaging for preoperative planning. Again, there is no rush to surgery intervention.       No orders of the defined types were placed in this encounter.    Follow up in 3 months    Scribe Attestation  By signing my name below, IBritni Scribe   attest that this documentation has been prepared  under the direction and in the presence of Adelfo Madden MD.

## 2025-04-28 ENCOUNTER — APPOINTMENT (OUTPATIENT)
Dept: ORTHOPEDIC SURGERY | Facility: CLINIC | Age: 85
End: 2025-04-28
Payer: MEDICARE

## 2025-04-28 DIAGNOSIS — M25.511 CHRONIC RIGHT SHOULDER PAIN: ICD-10-CM

## 2025-04-28 DIAGNOSIS — M25.512 LEFT SHOULDER PAIN, UNSPECIFIED CHRONICITY: Primary | ICD-10-CM

## 2025-04-28 DIAGNOSIS — G89.29 CHRONIC RIGHT SHOULDER PAIN: ICD-10-CM

## 2025-04-28 PROCEDURE — 20610 DRAIN/INJ JOINT/BURSA W/O US: CPT | Performed by: ORTHOPAEDIC SURGERY

## 2025-04-28 PROCEDURE — 99213 OFFICE O/P EST LOW 20 MIN: CPT | Performed by: ORTHOPAEDIC SURGERY

## 2025-04-28 PROCEDURE — 1123F ACP DISCUSS/DSCN MKR DOCD: CPT | Performed by: ORTHOPAEDIC SURGERY

## 2025-04-28 PROCEDURE — 1157F ADVNC CARE PLAN IN RCRD: CPT | Performed by: ORTHOPAEDIC SURGERY

## 2025-04-28 RX ORDER — LIDOCAINE HYDROCHLORIDE 10 MG/ML
4 INJECTION, SOLUTION INFILTRATION; PERINEURAL
Status: COMPLETED | OUTPATIENT
Start: 2025-04-28 | End: 2025-04-28

## 2025-04-28 RX ORDER — TRIAMCINOLONE ACETONIDE 40 MG/ML
40 INJECTION, SUSPENSION INTRA-ARTICULAR; INTRAMUSCULAR
Status: COMPLETED | OUTPATIENT
Start: 2025-04-28 | End: 2025-04-28

## 2025-04-28 RX ADMIN — LIDOCAINE HYDROCHLORIDE 4 ML: 10 INJECTION, SOLUTION INFILTRATION; PERINEURAL at 10:41

## 2025-04-28 RX ADMIN — TRIAMCINOLONE ACETONIDE 40 MG: 40 INJECTION, SUSPENSION INTRA-ARTICULAR; INTRAMUSCULAR at 10:41

## 2025-07-28 ENCOUNTER — APPOINTMENT (OUTPATIENT)
Dept: ORTHOPEDIC SURGERY | Facility: CLINIC | Age: 85
End: 2025-07-28
Payer: MEDICARE

## 2025-08-04 ENCOUNTER — APPOINTMENT (OUTPATIENT)
Dept: ORTHOPEDIC SURGERY | Facility: CLINIC | Age: 85
End: 2025-08-04
Payer: MEDICARE

## 2025-08-05 ENCOUNTER — APPOINTMENT (OUTPATIENT)
Dept: PRIMARY CARE | Facility: CLINIC | Age: 85
End: 2025-08-05
Payer: MEDICARE

## 2025-08-05 VITALS
BODY MASS INDEX: 25.31 KG/M2 | RESPIRATION RATE: 12 BRPM | DIASTOLIC BLOOD PRESSURE: 80 MMHG | TEMPERATURE: 96.6 F | HEART RATE: 72 BPM | OXYGEN SATURATION: 96 % | SYSTOLIC BLOOD PRESSURE: 132 MMHG | HEIGHT: 70 IN | WEIGHT: 176.8 LBS

## 2025-08-05 DIAGNOSIS — I42.9 CARDIOMYOPATHY, IDIOPATHIC (MULTI): Chronic | ICD-10-CM

## 2025-08-05 DIAGNOSIS — E55.9 VITAMIN D DEFICIENCY: ICD-10-CM

## 2025-08-05 DIAGNOSIS — Z98.890 STATUS POST IMPLANTATION OF MITRAL VALVE LEAFLET CLIP: Primary | Chronic | ICD-10-CM

## 2025-08-05 DIAGNOSIS — Z00.00 ROUTINE GENERAL MEDICAL EXAMINATION AT HEALTH CARE FACILITY: ICD-10-CM

## 2025-08-05 DIAGNOSIS — I10 BENIGN ESSENTIAL HYPERTENSION: Chronic | ICD-10-CM

## 2025-08-05 DIAGNOSIS — I71.21 ANEURYSM OF ASCENDING AORTA WITHOUT RUPTURE: Chronic | ICD-10-CM

## 2025-08-05 DIAGNOSIS — E78.2 MIXED HYPERLIPIDEMIA: ICD-10-CM

## 2025-08-05 DIAGNOSIS — I50.20 HFREF (HEART FAILURE WITH REDUCED EJECTION FRACTION): ICD-10-CM

## 2025-08-05 DIAGNOSIS — Z95.818 STATUS POST IMPLANTATION OF MITRAL VALVE LEAFLET CLIP: Primary | Chronic | ICD-10-CM

## 2025-08-05 DIAGNOSIS — Z85.46 HISTORY OF PROSTATE CANCER: ICD-10-CM

## 2025-08-05 DIAGNOSIS — R01.1 HEART MURMUR: ICD-10-CM

## 2025-08-05 DIAGNOSIS — I49.3 FREQUENT PVCS: Chronic | ICD-10-CM

## 2025-08-05 LAB
ALBUMIN SERPL-MCNC: 4.6 G/DL (ref 3.6–5.1)
ALP SERPL-CCNC: 61 U/L (ref 35–144)
ALT SERPL-CCNC: 15 U/L (ref 9–46)
ANION GAP SERPL CALCULATED.4IONS-SCNC: 6 MMOL/L (CALC) (ref 7–17)
AST SERPL-CCNC: 20 U/L (ref 10–35)
BASOPHILS # BLD AUTO: 21 CELLS/UL (ref 0–200)
BASOPHILS NFR BLD AUTO: 0.3 %
BILIRUB SERPL-MCNC: 1.7 MG/DL (ref 0.2–1.2)
BUN SERPL-MCNC: 35 MG/DL (ref 7–25)
CALCIUM SERPL-MCNC: 9.8 MG/DL (ref 8.6–10.3)
CHLORIDE SERPL-SCNC: 104 MMOL/L (ref 98–110)
CHOLEST SERPL-MCNC: 226 MG/DL
CHOLEST/HDLC SERPL: 4 (CALC)
CO2 SERPL-SCNC: 26 MMOL/L (ref 20–32)
CREAT SERPL-MCNC: 1.15 MG/DL (ref 0.7–1.22)
EGFRCR SERPLBLD CKD-EPI 2021: 63 ML/MIN/1.73M2
EOSINOPHIL # BLD AUTO: 192 CELLS/UL (ref 15–500)
EOSINOPHIL NFR BLD AUTO: 2.7 %
ERYTHROCYTE [DISTWIDTH] IN BLOOD BY AUTOMATED COUNT: 14.3 % (ref 11–15)
GLUCOSE SERPL-MCNC: 84 MG/DL (ref 65–99)
HCT VFR BLD AUTO: 44.6 % (ref 38.5–50)
HDLC SERPL-MCNC: 57 MG/DL
HGB BLD-MCNC: 14.3 G/DL (ref 13.2–17.1)
LDLC SERPL CALC-MCNC: 143 MG/DL (CALC)
LYMPHOCYTES # BLD AUTO: 2009 CELLS/UL (ref 850–3900)
LYMPHOCYTES NFR BLD AUTO: 28.3 %
MCH RBC QN AUTO: 28.1 PG (ref 27–33)
MCHC RBC AUTO-ENTMCNC: 32.1 G/DL (ref 32–36)
MCV RBC AUTO: 87.6 FL (ref 80–100)
MONOCYTES # BLD AUTO: 809 CELLS/UL (ref 200–950)
MONOCYTES NFR BLD AUTO: 11.4 %
NEUTROPHILS # BLD AUTO: 4068 CELLS/UL (ref 1500–7800)
NEUTROPHILS NFR BLD AUTO: 57.3 %
NONHDLC SERPL-MCNC: 169 MG/DL (CALC)
PLATELET # BLD AUTO: 219 THOUSAND/UL (ref 140–400)
PMV BLD REES-ECKER: 10.8 FL (ref 7.5–12.5)
POTASSIUM SERPL-SCNC: 4.8 MMOL/L (ref 3.5–5.3)
PROT SERPL-MCNC: 7.1 G/DL (ref 6.1–8.1)
PSA SERPL-MCNC: 0.06 NG/ML
RBC # BLD AUTO: 5.09 MILLION/UL (ref 4.2–5.8)
SODIUM SERPL-SCNC: 136 MMOL/L (ref 135–146)
TRIGL SERPL-MCNC: 131 MG/DL
WBC # BLD AUTO: 7.1 THOUSAND/UL (ref 3.8–10.8)

## 2025-08-05 PROCEDURE — 3075F SYST BP GE 130 - 139MM HG: CPT | Performed by: FAMILY MEDICINE

## 2025-08-05 PROCEDURE — 99214 OFFICE O/P EST MOD 30 MIN: CPT | Performed by: FAMILY MEDICINE

## 2025-08-05 PROCEDURE — 1159F MED LIST DOCD IN RCRD: CPT | Performed by: FAMILY MEDICINE

## 2025-08-05 PROCEDURE — 1036F TOBACCO NON-USER: CPT | Performed by: FAMILY MEDICINE

## 2025-08-05 PROCEDURE — 1160F RVW MEDS BY RX/DR IN RCRD: CPT | Performed by: FAMILY MEDICINE

## 2025-08-05 PROCEDURE — 3079F DIAST BP 80-89 MM HG: CPT | Performed by: FAMILY MEDICINE

## 2025-08-05 ASSESSMENT — ENCOUNTER SYMPTOMS
DIFFICULTY URINATING: 1
PSYCHIATRIC NEGATIVE: 1
MUSCULOSKELETAL NEGATIVE: 1
CARDIOVASCULAR NEGATIVE: 1
NEUROLOGICAL NEGATIVE: 1
RESPIRATORY NEGATIVE: 1
GASTROINTESTINAL NEGATIVE: 1

## 2025-08-05 NOTE — ASSESSMENT & PLAN NOTE
status post brachii therapy 2005  PSA have consistently been undetectable  Repeat PSA due 7/2025 - ordered

## 2025-08-05 NOTE — PROGRESS NOTES
"Subjective   Patient ID: Josesito Tamayo is a 85 y.o. male who presents for Follow-up (6 mo fuv) and Nasal Congestion.    He has been experiencing more urinary leakage, he does not self cath himself every time he has to go. He states this is manageable for him.  He is following with Dr. Madden for his shoulders. He was last seen on 4/28 and received bilateral injections. He has a follow up appointment scheduled 8/25/25.  He has managed to lose a little bit of weight which he is happy about, he reports feeling better. He has not been walking as much as he used to for exercise but he does have a stationary bike in his basement that he has been using. He denies any breathing or heart symptoms.   Nasal congestion is worsening and he feels like he has fluid in his ears. This started last week when he was in [place] visiting his daughter.         Review of Systems   HENT:  Positive for congestion.    Respiratory: Negative.     Cardiovascular: Negative.    Gastrointestinal: Negative.    Genitourinary:  Positive for difficulty urinating (incontinence).   Musculoskeletal: Negative.    Neurological: Negative.    Psychiatric/Behavioral: Negative.         Objective   /80 (BP Location: Right arm, Patient Position: Sitting, BP Cuff Size: Adult)   Pulse 72   Temp 35.9 °C (96.6 °F) (Temporal)   Resp 12   Ht 1.778 m (5' 10\")   Wt 80.2 kg (176 lb 12.8 oz)   SpO2 96%   BMI 25.37 kg/m²     Physical Exam  Constitutional:       Appearance: Normal appearance. He is normal weight.   HENT:      Head: Normocephalic.      Right Ear: Tympanic membrane normal.      Left Ear: Tympanic membrane normal.      Nose: Congestion present.      Mouth/Throat:      Pharynx: Posterior oropharyngeal erythema and postnasal drip present.     Eyes:      Conjunctiva/sclera: Conjunctivae normal.       Cardiovascular:      Heart sounds: Murmur heard.      Crescendo systolic murmur is present with a grade of 2/6.     Musculoskeletal:      Cervical " back: Normal range of motion.     Neurological:      General: No focal deficit present.      Mental Status: He is alert.     Psychiatric:         Mood and Affect: Mood normal.         Behavior: Behavior normal.         Thought Content: Thought content normal.         Judgment: Judgment normal.         Assessment/Plan   Problem List Items Addressed This Visit       Aortic aneurysm, thoracic (Chronic)    Improved with 3.7cm dilation on Echo 12/2024  (3.8cm on 12/2023 vs 4.4 3/2023)  Continue to monitor with imaging yearly   Continue current treatment plan with goal BP <130/80         Relevant Orders    CBC and Auto Differential    Benign essential hypertension (Chronic)    Blood pressure in office today:  BP Readings from Last 1 Encounters:   08/05/25 132/80   The goal range for blood pressure is below 130/80.   We will continue to monitor.   Did not tolerate losartan in past.  Continue lisinopril 5 mg and metoprolol 25 mg daily.         Relevant Orders    Comprehensive Metabolic Panel    Frequent PVCs (Chronic)    No symptoms  Continue metoprolol         Heart murmur    Due to MR  Echo 12/2024  TTE 12/4/2024 EF 45 to 50%, mild to moderate MR 2 mitral clips present.  Will continue to monitor.     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   Follows with [facility] and cardiology Dr Jackson         Relevant Orders    CBC and Auto Differential    Mixed hyperlipidemia    Lab Results   Component Value Date    CHOL 201 (H)  01/30/2024    CHOL 189 01/09/2023    CHOL 231 (H) 01/03/2022     Lab Results   Component Value Date    HDL 56.8 01/30/2024    HDL 48.5 01/09/2023    HDL 57.7 01/03/2022     Lab Results   Component Value Date    LDLCALC 125 (H) 01/30/2024     Lab Results   Component Value Date    TRIG 95 01/30/2024    TRIG 98 01/09/2023    TRIG 90 01/03/2022   Stable.  Continue pravastatin 40 mg daily.         Relevant Orders    Lipid Panel    Cardiomyopathy, idiopathic (Multi) (Chronic)    Per Dr Jackson 1/30/2025 visit  Nonischemic cardiomyopathy. Minimal plaquing on a cardiac catheterization from October 2022. EF 40%.  His most recent echo shows an ejection fraction of 50%.  Continue lisinopril metoprolol.  Unable to uptitrate the dose of metoprolol because of an underlying bradycardia.  Creatinine stable at 1.3.  He is totally off Lasix at this time.  EF 45 to 50% 12/4/2024.  No volume overload.  Doing great without Lasix          Relevant Orders    CBC and Auto Differential    Vitamin D deficiency    Lab Results   Component Value Date    VITD25 34 01/30/2024   Continue vit D supplement.          Other Visit Diagnoses         Routine general medical examination at health care facility              Follow up: Schedule in 6 months    Scribe Attestation  By signing my name below, IKareen Scribe   attest that this documentation has been prepared under the direction and in the presence of Ronnie Kay MD.

## 2025-08-05 NOTE — ASSESSMENT & PLAN NOTE
Blood pressure in office today:  BP Readings from Last 1 Encounters:   08/05/25 132/80   The goal range for blood pressure is below 130/80.   We will continue to monitor.   Did not tolerate losartan in past.  Continue lisinopril 5 mg and metoprolol 25 mg daily.

## 2025-08-06 DIAGNOSIS — Z85.46 HISTORY OF PROSTATE CANCER: Primary | ICD-10-CM

## 2025-08-06 DIAGNOSIS — E78.2 MIXED HYPERLIPIDEMIA: ICD-10-CM

## 2025-08-06 RX ORDER — ROSUVASTATIN CALCIUM 40 MG/1
40 TABLET, COATED ORAL DAILY
Qty: 90 TABLET | Refills: 3 | Status: SHIPPED | OUTPATIENT
Start: 2025-08-06

## 2025-08-25 ENCOUNTER — APPOINTMENT (OUTPATIENT)
Dept: ORTHOPEDIC SURGERY | Facility: CLINIC | Age: 85
End: 2025-08-25
Payer: MEDICARE

## 2025-08-25 VITALS — WEIGHT: 175 LBS | BODY MASS INDEX: 25.11 KG/M2

## 2025-08-25 DIAGNOSIS — G89.29 CHRONIC RIGHT SHOULDER PAIN: ICD-10-CM

## 2025-08-25 DIAGNOSIS — M25.512 LEFT SHOULDER PAIN, UNSPECIFIED CHRONICITY: Primary | ICD-10-CM

## 2025-08-25 DIAGNOSIS — M25.511 CHRONIC RIGHT SHOULDER PAIN: ICD-10-CM

## 2025-08-25 PROCEDURE — 20610 DRAIN/INJ JOINT/BURSA W/O US: CPT | Performed by: ORTHOPAEDIC SURGERY

## 2025-08-25 PROCEDURE — 99213 OFFICE O/P EST LOW 20 MIN: CPT | Performed by: ORTHOPAEDIC SURGERY

## 2025-08-25 PROCEDURE — 1125F AMNT PAIN NOTED PAIN PRSNT: CPT | Performed by: ORTHOPAEDIC SURGERY

## 2025-08-25 PROCEDURE — 1159F MED LIST DOCD IN RCRD: CPT | Performed by: ORTHOPAEDIC SURGERY

## 2025-08-25 RX ADMIN — TRIAMCINOLONE ACETONIDE 40 MG: 40 INJECTION, SUSPENSION INTRA-ARTICULAR; INTRAMUSCULAR at 11:32

## 2025-08-25 RX ADMIN — LIDOCAINE HYDROCHLORIDE 4 ML: 10 INJECTION, SOLUTION INFILTRATION; PERINEURAL at 11:32

## 2025-08-25 ASSESSMENT — PAIN - FUNCTIONAL ASSESSMENT: PAIN_FUNCTIONAL_ASSESSMENT: 0-10

## 2025-08-25 ASSESSMENT — PAIN DESCRIPTION - DESCRIPTORS: DESCRIPTORS: ACHING

## 2025-08-25 ASSESSMENT — PAIN SCALES - GENERAL: PAINLEVEL_OUTOF10: 4

## 2025-08-28 RX ORDER — LIDOCAINE HYDROCHLORIDE 10 MG/ML
4 INJECTION, SOLUTION INFILTRATION; PERINEURAL
Status: COMPLETED | OUTPATIENT
Start: 2025-08-25 | End: 2025-08-25

## 2025-08-28 RX ORDER — TRIAMCINOLONE ACETONIDE 40 MG/ML
40 INJECTION, SUSPENSION INTRA-ARTICULAR; INTRAMUSCULAR
Status: COMPLETED | OUTPATIENT
Start: 2025-08-25 | End: 2025-08-25

## 2025-09-03 ENCOUNTER — APPOINTMENT (OUTPATIENT)
Age: 85
End: 2025-09-03
Payer: MEDICARE

## 2025-11-06 ENCOUNTER — APPOINTMENT (OUTPATIENT)
Dept: CARDIOLOGY | Facility: CLINIC | Age: 85
End: 2025-11-06
Payer: MEDICARE

## 2025-11-24 ENCOUNTER — APPOINTMENT (OUTPATIENT)
Dept: ORTHOPEDIC SURGERY | Facility: CLINIC | Age: 85
End: 2025-11-24
Payer: MEDICARE

## 2026-01-15 ENCOUNTER — APPOINTMENT (OUTPATIENT)
Dept: CARDIOLOGY | Facility: CLINIC | Age: 86
End: 2026-01-15
Payer: MEDICARE

## 2026-02-03 ENCOUNTER — APPOINTMENT (OUTPATIENT)
Dept: PRIMARY CARE | Facility: CLINIC | Age: 86
End: 2026-02-03
Payer: MEDICARE

## 2026-09-03 ENCOUNTER — APPOINTMENT (OUTPATIENT)
Age: 86
End: 2026-09-03
Payer: MEDICARE